# Patient Record
Sex: FEMALE | Race: BLACK OR AFRICAN AMERICAN | Employment: UNEMPLOYED | URBAN - METROPOLITAN AREA
[De-identification: names, ages, dates, MRNs, and addresses within clinical notes are randomized per-mention and may not be internally consistent; named-entity substitution may affect disease eponyms.]

---

## 2017-06-04 ENCOUNTER — HOSPITAL ENCOUNTER (INPATIENT)
Age: 72
LOS: 1 days | Discharge: NURSING, REHAB OR LONG-TERM CARE | DRG: 392 | End: 2017-06-06
Attending: EMERGENCY MEDICINE | Admitting: INTERNAL MEDICINE
Payer: COMMERCIAL

## 2017-06-04 ENCOUNTER — APPOINTMENT (OUTPATIENT)
Dept: GENERAL RADIOLOGY | Age: 72
DRG: 392 | End: 2017-06-04
Attending: EMERGENCY MEDICINE
Payer: COMMERCIAL

## 2017-06-04 ENCOUNTER — APPOINTMENT (OUTPATIENT)
Dept: CT IMAGING | Age: 72
DRG: 392 | End: 2017-06-04
Attending: EMERGENCY MEDICINE
Payer: COMMERCIAL

## 2017-06-04 DIAGNOSIS — I63.9 CVA (CEREBRAL INFARCTION): ICD-10-CM

## 2017-06-04 DIAGNOSIS — D64.9 ANEMIA: ICD-10-CM

## 2017-06-04 DIAGNOSIS — D64.9 ANEMIA, UNSPECIFIED TYPE: ICD-10-CM

## 2017-06-04 DIAGNOSIS — Z79.899 ENCOUNTER FOR LONG-TERM (CURRENT) USE OF OTHER MEDICATIONS: ICD-10-CM

## 2017-06-04 DIAGNOSIS — I10 HTN (HYPERTENSION): ICD-10-CM

## 2017-06-04 DIAGNOSIS — R10.13 ABDOMINAL PAIN, EPIGASTRIC: Primary | ICD-10-CM

## 2017-06-04 DIAGNOSIS — R77.8 ELEVATED TROPONIN: ICD-10-CM

## 2017-06-04 LAB
ALBUMIN SERPL BCP-MCNC: 3.3 G/DL (ref 3.5–5)
ALBUMIN/GLOB SERPL: 0.9 {RATIO} (ref 1.1–2.2)
ALP SERPL-CCNC: 90 U/L (ref 45–117)
ALT SERPL-CCNC: 18 U/L (ref 12–78)
ANION GAP BLD CALC-SCNC: 3 MMOL/L (ref 5–15)
AST SERPL W P-5'-P-CCNC: 13 U/L (ref 15–37)
BASOPHILS # BLD AUTO: 0.1 K/UL (ref 0–0.1)
BASOPHILS # BLD: 1 % (ref 0–1)
BILIRUB SERPL-MCNC: 0.3 MG/DL (ref 0.2–1)
BUN SERPL-MCNC: 26 MG/DL (ref 6–20)
BUN/CREAT SERPL: 23 (ref 12–20)
CALCIUM SERPL-MCNC: 9.2 MG/DL (ref 8.5–10.1)
CHLORIDE SERPL-SCNC: 107 MMOL/L (ref 97–108)
CK MB CFR SERPL CALC: NORMAL % (ref 0–2.5)
CK MB SERPL-MCNC: <1 NG/ML (ref 5–25)
CK SERPL-CCNC: 443 U/L (ref 26–192)
CO2 SERPL-SCNC: 31 MMOL/L (ref 21–32)
CREAT SERPL-MCNC: 1.15 MG/DL (ref 0.55–1.02)
DIFFERENTIAL METHOD BLD: ABNORMAL
EOSINOPHIL # BLD: 0.1 K/UL (ref 0–0.4)
EOSINOPHIL NFR BLD: 1 % (ref 0–7)
ERYTHROCYTE [DISTWIDTH] IN BLOOD BY AUTOMATED COUNT: 18.4 % (ref 11.5–14.5)
GLOBULIN SER CALC-MCNC: 3.7 G/DL (ref 2–4)
GLUCOSE SERPL-MCNC: 125 MG/DL (ref 65–100)
HCT VFR BLD AUTO: 29.3 % (ref 35–47)
HGB BLD-MCNC: 8.8 G/DL (ref 11.5–16)
LYMPHOCYTES # BLD AUTO: 13 % (ref 12–49)
LYMPHOCYTES # BLD: 1 K/UL (ref 0.8–3.5)
MCH RBC QN AUTO: 21.8 PG (ref 26–34)
MCHC RBC AUTO-ENTMCNC: 30 G/DL (ref 30–36.5)
MCV RBC AUTO: 72.7 FL (ref 80–99)
MONOCYTES # BLD: 0.6 K/UL (ref 0–1)
MONOCYTES NFR BLD AUTO: 8 % (ref 5–13)
NEUTS SEG # BLD: 6.1 K/UL (ref 1.8–8)
NEUTS SEG NFR BLD AUTO: 77 % (ref 32–75)
PLATELET # BLD AUTO: 221 K/UL (ref 150–400)
POTASSIUM SERPL-SCNC: 4.1 MMOL/L (ref 3.5–5.1)
PROT SERPL-MCNC: 7 G/DL (ref 6.4–8.2)
RBC # BLD AUTO: 4.03 M/UL (ref 3.8–5.2)
RBC MORPH BLD: ABNORMAL
SODIUM SERPL-SCNC: 141 MMOL/L (ref 136–145)
TROPONIN I SERPL-MCNC: 0.24 NG/ML
WBC # BLD AUTO: 7.9 K/UL (ref 3.6–11)

## 2017-06-04 PROCEDURE — 84484 ASSAY OF TROPONIN QUANT: CPT | Performed by: EMERGENCY MEDICINE

## 2017-06-04 PROCEDURE — 96374 THER/PROPH/DIAG INJ IV PUSH: CPT

## 2017-06-04 PROCEDURE — 99285 EMERGENCY DEPT VISIT HI MDM: CPT

## 2017-06-04 PROCEDURE — 85025 COMPLETE CBC W/AUTO DIFF WBC: CPT | Performed by: EMERGENCY MEDICINE

## 2017-06-04 PROCEDURE — 36415 COLL VENOUS BLD VENIPUNCTURE: CPT | Performed by: EMERGENCY MEDICINE

## 2017-06-04 PROCEDURE — 96375 TX/PRO/DX INJ NEW DRUG ADDON: CPT

## 2017-06-04 PROCEDURE — C9113 INJ PANTOPRAZOLE SODIUM, VIA: HCPCS | Performed by: EMERGENCY MEDICINE

## 2017-06-04 PROCEDURE — 71020 XR CHEST PA LAT: CPT

## 2017-06-04 PROCEDURE — 83690 ASSAY OF LIPASE: CPT | Performed by: EMERGENCY MEDICINE

## 2017-06-04 PROCEDURE — 74011250636 HC RX REV CODE- 250/636: Performed by: EMERGENCY MEDICINE

## 2017-06-04 PROCEDURE — 80053 COMPREHEN METABOLIC PANEL: CPT | Performed by: EMERGENCY MEDICINE

## 2017-06-04 PROCEDURE — 82550 ASSAY OF CK (CPK): CPT | Performed by: EMERGENCY MEDICINE

## 2017-06-04 PROCEDURE — 93005 ELECTROCARDIOGRAM TRACING: CPT

## 2017-06-04 PROCEDURE — 82553 CREATINE MB FRACTION: CPT | Performed by: EMERGENCY MEDICINE

## 2017-06-04 PROCEDURE — 74176 CT ABD & PELVIS W/O CONTRAST: CPT

## 2017-06-04 RX ORDER — PANTOPRAZOLE SODIUM 40 MG/10ML
40 INJECTION, POWDER, LYOPHILIZED, FOR SOLUTION INTRAVENOUS
Status: COMPLETED | OUTPATIENT
Start: 2017-06-04 | End: 2017-06-04

## 2017-06-04 RX ORDER — MORPHINE SULFATE 2 MG/ML
4 INJECTION, SOLUTION INTRAMUSCULAR; INTRAVENOUS
Status: COMPLETED | OUTPATIENT
Start: 2017-06-04 | End: 2017-06-04

## 2017-06-04 RX ORDER — ONDANSETRON 2 MG/ML
4 INJECTION INTRAMUSCULAR; INTRAVENOUS
Status: COMPLETED | OUTPATIENT
Start: 2017-06-04 | End: 2017-06-04

## 2017-06-04 RX ADMIN — PANTOPRAZOLE SODIUM 40 MG: 40 INJECTION, POWDER, FOR SOLUTION INTRAVENOUS at 22:37

## 2017-06-04 RX ADMIN — ONDANSETRON HYDROCHLORIDE 4 MG: 2 INJECTION, SOLUTION INTRAMUSCULAR; INTRAVENOUS at 23:04

## 2017-06-04 RX ADMIN — Medication 4 MG: at 23:04

## 2017-06-04 NOTE — IP AVS SNAPSHOT
[address] [facility] 83. 
[phone] Patient: Daija Smith MRN: [id number] QVL:[id number]/41/1556 You are allergic to the following Allergen Reactions Benadryl (Diphenhydramine Hcl) Not Reported This Time Codeine Hives Pcn (Penicillins) Hives Sulfa (Sulfonamide Antibiotics) Hives Recent Documentation Height Weight Breastfeeding? BMI OB Status Smoking Status 1.575 m 68.9 kg No 27.8 kg/m2 Postmenopausal Never Smoker Emergency Contacts Name Discharge Info Relation Home Work Mobile Rosaura Greco  Child [2] [phone] About your hospitalization You were admitted on:  June 5, 2017 You last received care in the:  [facility] You were discharged on:  June 6, 2017 Unit phone number:  [phone] Why you were hospitalized Your primary diagnosis was:  Not on File Your diagnoses also included:  Chest Pain, Anemia, Acs (Acute Coronary Syndrome) (Hcc), Elevated Troponin Providers Seen During Your Hospitalizations Provider Role Specialty Primary office phone Jonelle Goodpasture, DO Attending Provider Emergency Medicine [phone] Tressa Spencer MD Attending Provider Internal Medicine [phone] Your Primary Care Physician (PCP) Primary Care Physician Office Phone Office Fax Luciano Zhong [phone] Follow-up Information Follow up With Details Comments Contact Info Will Boudreaux MD In 1 week [facility] will follow with patient's appointment. [phone] [id number] 
[phone] Current Discharge Medication List  
  
START taking these medications Dose & Instructions Dispensing Information Comments Morning Noon Evening Bedtime  
 aspirin 81 mg chewable tablet Replaces:  ASPIR-LOW 81 mg tablet Your last dose was: Your next dose is:    
   
   
 Dose:  81 mg  
1 Tab by Per G Tube route daily for 30 days. Quantity:  30 Tab Refills:  0 CONTINUE these medications which have CHANGED Dose & Instructions Dispensing Information Comments Morning Noon Evening Bedtime  
 carvedilol 6.25 mg tablet Commonly known as:  Elisa Kang What changed:  how much to take Your last dose was: Your next dose is:    
   
   
 Dose:  12.5 mg Take 2 Tabs by mouth two (2) times daily (with meals) for 30 days. Quantity:  120 Tab Refills:  0 CONTINUE these medications which have NOT CHANGED Dose & Instructions Dispensing Information Comments Morning Noon Evening Bedtime  
 amLODIPine 10 mg tablet Commonly known as:  Karyle Rohrer Your last dose was: Your next dose is:    
   
   
 Dose:  10 mg  
10 mg by PEG Tube route daily. Refills:  0  
     
   
   
   
  
 cholecalciferol 1,000 unit Cap Commonly known as:  VITAMIN D3 Your last dose was: Your next dose is:    
   
   
 Dose:  1000 Units Take 1,000 Units by mouth daily. Refills:  0 DULCOLAX (BISACODYL) 5 mg EC tablet Generic drug:  bisacodyl Your last dose was: Your next dose is:    
   
   
 Dose:  5 mg Take 5 mg by mouth daily as needed for Constipation. Refills:  0  
     
   
   
   
  
 ferrous sulfate 325 mg (65 mg iron) Cper Your last dose was: Your next dose is: Take  by mouth three (3) times daily. Refills:  0  
     
   
   
   
  
 folic acid 1 mg tablet Commonly known as:  [employer] Your last dose was: Your next dose is: Take  by mouth daily. Refills:  0  
     
   
   
   
  
 gabapentin 100 mg capsule Commonly known as:  NEURONTIN Your last dose was: Your next dose is:    
   
   
 Dose:  300 mg Take 300 mg by mouth daily. Refills:  0  
     
   
   
   
  
 hydrALAZINE 50 mg tablet Commonly known as:  APRESOLINE Your last dose was: Your next dose is:    
   
   
 Dose:  25 mg  
25 mg by PEG Tube route three (3) times daily. Refills:  0  
     
   
   
   
  
 isosorbide dinitrate 10 mg tablet Commonly known as:  ISORDIL Your last dose was: Your next dose is: Take  by mouth three (3) times daily. Refills:  0 KEPPRA 750 mg tablet Generic drug:  levETIRAcetam  
   
Your last dose was: Your next dose is:    
   
   
 Dose:  750 mg  
750 mg by PEG Tube route two (2) times a day. Refills:  0  
     
   
   
   
  
 ketoconazole 2 % topical cream  
Commonly known as:  NIZORAL Your last dose was: Your next dose is:    
   
   
 Apply  to affected area. Indications: Apply to affected area every 8 hours as needed for rash Refills:  0  
     
   
   
   
  
 LASIX 20 mg tablet Generic drug:  furosemide Your last dose was: Your next dose is:    
   
   
 Dose:  20 mg  
20 mg by PEG Tube route daily. Refills:  0  
     
   
   
   
  
 levothyroxine 75 mcg tablet Commonly known as:  SYNTHROID Your last dose was: Your next dose is:    
   
   
 Dose:  75 mcg Take 75 mcg by mouth Daily (before breakfast). Refills:  0 LIPITOR 40 mg tablet Generic drug:  atorvastatin Your last dose was: Your next dose is:    
   
   
 Dose:  40 mg Take 40 mg by mouth daily. Refills:  0  
     
   
   
   
  
 nitroglycerin 0.4 mg SL tablet Commonly known as:  NITROSTAT Your last dose was: Your next dose is:    
   
   
 by SubLINGual route every five (5) minutes as needed. Refills:  0 NovoLIN N 100 unit/mL injection Generic drug:  insulin NPH Your last dose was: Your next dose is:    
   
   
 Dose:  14 Units 14 Units by SubCUTAneous route every twelve (12) hours. Refills:  0  
     
   
   
   
  
 potassium chloride 20 mEq packet Commonly known as:  KLOR-CON Your last dose was: Your next dose is:    
   
   
 Dose:  20 mEq Take 20 mEq by mouth daily (with breakfast). Refills:  0  
     
   
   
   
  
 raNITIdine hcl 150 mg capsule Your last dose was: Your next dose is:    
   
   
 Dose:  150 mg Take 150 mg by mouth two (2) times a day. Refills:  0 Senna 8.6 mg Cap Generic drug:  sennosides Your last dose was: Your next dose is: Take  by mouth. Qd prn Refills:  0  
     
   
   
   
  
 TYLENOL 325 mg tablet Generic drug:  acetaminophen Your last dose was: Your next dose is: Take  by mouth every four (4) hours as needed for Pain. Refills:  0 STOP taking these medications ASPIR-LOW 81 mg tablet Generic drug:  aspirin delayed-release Replaced by:  aspirin 81 mg chewable tablet  
   
  
 aspirin, buffered 81 mg Tab Where to Get Your Medications Information on where to get these meds will be given to you by the nurse or doctor. ! Ask your nurse or doctor about these medications  
  aspirin 81 mg chewable tablet  
 carvedilol 6.25 mg tablet Discharge Instructions HOSPITALIST DISCHARGE INSTRUCTIONS 
 
NAME: Cinthya Elias :  1945 MRN:  [id number] Date/Time:  2017 1:22 PM 
 
ADMIT DATE: 2017 DISCHARGE DATE: 2017 Attending Physician: Víctor Curry MD 
 
DISCHARGE DIAGNOSIS: 
Chronic epigastric/chest pain with minimally elevated troponin in setting of CAD and remote ICH with dyphagia and chronic PEG tube DM2 with nephropathy (CKD3, at baseline) and anemia of CKD 
HTN, benign/essential 
Hyperlipidemia Hypothyroidism Medications: Per above medication reconciliation. Pain Management: per above medications Recommended diet: PEG feeding (TwoCal) at 30 ml/hr continuous with free water flushes 125mL every 6 hours Recommended activity: Activity as tolerated Wound care: None Indwelling devices:  PEG tube with routine nursing care Supplemental Oxygen: None Required Lab work: None Glucose management:  Accucheck every 6 hours with sliding scale per SNF protocol Code status: Full Outside physician follow up: Follow-up Information Follow up With Details Comments Contact Info Rahul Bennett MD In 1 week  [address] [facility] 
[phone] Skilled nursing facility/ SNF MD responsible for above on discharge. Information obtained by : 
I understand that if any problems occur once I am at home I am to contact my physician. I understand and acknowledge receipt of the instructions indicated above. Physician's or R.N.'s Signature                                                                  Date/Time Patient or Representative Discharge Orders None [facility] Announcement We are excited to announce that we are making your provider's discharge notes available to you in CDELhart. You will see these notes when they are completed and signed by the physician that discharged you from your recent hospital stay. If you have any questions or concerns about any information you see in CDELhart, please call the Health Information Department where you were seen or reach out to your Primary Care Provider for more information about your plan of care. Introducing [facility] SERVICES! [address] [facility] introduces [employer] patient portal. Now you can access parts of your medical record, email your doctor's office, and request medication refills online. 1. In your internet browser, go to [email or web address] 2. Click on the First Time User? Click Here link in the Sign In box. You will see the New Member Sign Up page. 3. Enter your [employer] Access Code exactly as it appears below. You will not need to use this code after youve completed the sign-up process. If you do not sign up before the expiration date, you must request a new code. · [employer] Access Code: [email or web address] Expires: 9/3/2017 10:02 AM 
 
4. Enter the last four digits of your Social Security Number (xxxx) and Date of Birth (mm/dd/yyyy) as indicated and click Submit. You will be taken to the next sign-up page. 5. Create a [employer] ID. This will be your [employer] login ID and cannot be changed, so think of one that is secure and easy to remember. 6. Create a [employer] password. You can change your password at any time. 7. Enter your Password Reset Question and Answer. This can be used at a later time if you forget your password. 8. Enter your e-mail address. You will receive e-mail notification when new information is available in [address] 9. Click Sign Up. You can now view and download portions of your medical record. 10. Click the Download Summary menu link to download a portable copy of your medical information. If you have questions, please visit the Frequently Asked Questions section of the [employer] website. Remember, [employer] is NOT to be used for urgent needs. For medical emergencies, dial 911. Now available from your iPhone and Android! General Information Please provide this summary of care documentation to your next provider. Patient Signature:  ____________________________________________________________ Date:  ____________________________________________________________  
  
Anita Escobar Provider Signature:  ____________________________________________________________ Date:  ____________________________________________________________

## 2017-06-05 PROBLEM — R77.8 ELEVATED TROPONIN: Status: ACTIVE | Noted: 2017-06-05

## 2017-06-05 PROBLEM — I24.9 ACS (ACUTE CORONARY SYNDROME) (HCC): Status: ACTIVE | Noted: 2017-06-05

## 2017-06-05 PROBLEM — D64.9 ANEMIA: Status: ACTIVE | Noted: 2017-06-05

## 2017-06-05 PROBLEM — R07.9 CHEST PAIN: Status: ACTIVE | Noted: 2017-06-05

## 2017-06-05 LAB
ATRIAL RATE: 76 BPM
CALCULATED R AXIS, ECG10: -16 DEGREES
CALCULATED T AXIS, ECG11: 71 DEGREES
DIAGNOSIS, 93000: NORMAL
LIPASE SERPL-CCNC: 145 U/L (ref 73–393)
Q-T INTERVAL, ECG07: 444 MS
QRS DURATION, ECG06: 74 MS
QTC CALCULATION (BEZET), ECG08: 499 MS
TROPONIN I SERPL-MCNC: 0.22 NG/ML
TROPONIN I SERPL-MCNC: 0.23 NG/ML
VENTRICULAR RATE, ECG03: 76 BPM

## 2017-06-05 PROCEDURE — 36415 COLL VENOUS BLD VENIPUNCTURE: CPT | Performed by: INTERNAL MEDICINE

## 2017-06-05 PROCEDURE — 74011000250 HC RX REV CODE- 250: Performed by: INTERNAL MEDICINE

## 2017-06-05 PROCEDURE — C9113 INJ PANTOPRAZOLE SODIUM, VIA: HCPCS | Performed by: INTERNAL MEDICINE

## 2017-06-05 PROCEDURE — 74011250637 HC RX REV CODE- 250/637: Performed by: INTERNAL MEDICINE

## 2017-06-05 PROCEDURE — 65660000000 HC RM CCU STEPDOWN

## 2017-06-05 PROCEDURE — 74011250636 HC RX REV CODE- 250/636: Performed by: INTERNAL MEDICINE

## 2017-06-05 PROCEDURE — 84484 ASSAY OF TROPONIN QUANT: CPT | Performed by: EMERGENCY MEDICINE

## 2017-06-05 PROCEDURE — 93306 TTE W/DOPPLER COMPLETE: CPT

## 2017-06-05 PROCEDURE — 74011636637 HC RX REV CODE- 636/637: Performed by: INTERNAL MEDICINE

## 2017-06-05 RX ORDER — RANITIDINE 15 MG/ML
150 SYRUP ORAL 2 TIMES DAILY
Status: DISCONTINUED | OUTPATIENT
Start: 2017-06-05 | End: 2017-06-06 | Stop reason: HOSPADM

## 2017-06-05 RX ORDER — ISOSORBIDE DINITRATE 10 MG/1
TABLET ORAL 3 TIMES DAILY
COMMUNITY

## 2017-06-05 RX ORDER — KETOCONAZOLE 20 MG/G
CREAM TOPICAL
COMMUNITY

## 2017-06-05 RX ORDER — RANITIDINE 150 MG/1
150 CAPSULE ORAL 2 TIMES DAILY
COMMUNITY

## 2017-06-05 RX ORDER — ISOSORBIDE DINITRATE 10 MG/1
10 TABLET ORAL 3 TIMES DAILY
Status: DISCONTINUED | OUTPATIENT
Start: 2017-06-05 | End: 2017-06-06 | Stop reason: HOSPADM

## 2017-06-05 RX ORDER — ONDANSETRON 2 MG/ML
4 INJECTION INTRAMUSCULAR; INTRAVENOUS
Status: DISCONTINUED | OUTPATIENT
Start: 2017-06-05 | End: 2017-06-06 | Stop reason: HOSPADM

## 2017-06-05 RX ORDER — NITROGLYCERIN 0.4 MG/1
0.4 TABLET SUBLINGUAL AS NEEDED
Status: DISCONTINUED | OUTPATIENT
Start: 2017-06-05 | End: 2017-06-06 | Stop reason: HOSPADM

## 2017-06-05 RX ORDER — ASPIRIN 81 MG/1
81 TABLET ORAL DAILY
Status: DISCONTINUED | OUTPATIENT
Start: 2017-06-05 | End: 2017-06-05

## 2017-06-05 RX ORDER — POTASSIUM CHLORIDE 1.5 G/1.77G
20 POWDER, FOR SOLUTION ORAL
COMMUNITY

## 2017-06-05 RX ORDER — SODIUM CHLORIDE 0.9 % (FLUSH) 0.9 %
5-10 SYRINGE (ML) INJECTION EVERY 8 HOURS
Status: DISCONTINUED | OUTPATIENT
Start: 2017-06-05 | End: 2017-06-06 | Stop reason: HOSPADM

## 2017-06-05 RX ORDER — HYDRALAZINE HYDROCHLORIDE 25 MG/1
25 TABLET, FILM COATED ORAL 3 TIMES DAILY
Status: DISCONTINUED | OUTPATIENT
Start: 2017-06-05 | End: 2017-06-06 | Stop reason: HOSPADM

## 2017-06-05 RX ORDER — CARVEDILOL 6.25 MG/1
TABLET ORAL 2 TIMES DAILY WITH MEALS
Status: ON HOLD | COMMUNITY
End: 2017-06-06

## 2017-06-05 RX ORDER — CARVEDILOL 6.25 MG/1
6.25 TABLET ORAL 2 TIMES DAILY WITH MEALS
Status: DISCONTINUED | OUTPATIENT
Start: 2017-06-05 | End: 2017-06-05

## 2017-06-05 RX ORDER — SENNOSIDES 8.6 MG/1
1 TABLET ORAL DAILY PRN
Status: DISCONTINUED | OUTPATIENT
Start: 2017-06-05 | End: 2017-06-06 | Stop reason: HOSPADM

## 2017-06-05 RX ORDER — AMLODIPINE BESYLATE 5 MG/1
10 TABLET ORAL DAILY
Status: DISCONTINUED | OUTPATIENT
Start: 2017-06-05 | End: 2017-06-06 | Stop reason: HOSPADM

## 2017-06-05 RX ORDER — GUAIFENESIN 100 MG/5ML
81 LIQUID (ML) ORAL DAILY
Status: DISCONTINUED | OUTPATIENT
Start: 2017-06-06 | End: 2017-06-06 | Stop reason: HOSPADM

## 2017-06-05 RX ORDER — GLUCOSAMINE SULFATE 1500 MG
1000 POWDER IN PACKET (EA) ORAL DAILY
COMMUNITY

## 2017-06-05 RX ORDER — METOPROLOL TARTRATE 5 MG/5ML
5 INJECTION INTRAVENOUS
Status: DISCONTINUED | OUTPATIENT
Start: 2017-06-05 | End: 2017-06-06 | Stop reason: HOSPADM

## 2017-06-05 RX ORDER — HYDRALAZINE HYDROCHLORIDE 50 MG/1
25 TABLET, FILM COATED ORAL 3 TIMES DAILY
COMMUNITY

## 2017-06-05 RX ORDER — LEVOTHYROXINE SODIUM 75 UG/1
75 TABLET ORAL
Status: DISCONTINUED | OUTPATIENT
Start: 2017-06-05 | End: 2017-06-05

## 2017-06-05 RX ORDER — ATORVASTATIN CALCIUM 40 MG/1
40 TABLET, FILM COATED ORAL DAILY
COMMUNITY

## 2017-06-05 RX ORDER — ATORVASTATIN CALCIUM 40 MG/1
40 TABLET, FILM COATED ORAL DAILY
Status: DISCONTINUED | OUTPATIENT
Start: 2017-06-05 | End: 2017-06-06 | Stop reason: HOSPADM

## 2017-06-05 RX ORDER — FUROSEMIDE 20 MG/1
20 TABLET ORAL DAILY
Status: DISCONTINUED | OUTPATIENT
Start: 2017-06-05 | End: 2017-06-06 | Stop reason: HOSPADM

## 2017-06-05 RX ORDER — GABAPENTIN 300 MG/1
300 CAPSULE ORAL DAILY
Status: DISCONTINUED | OUTPATIENT
Start: 2017-06-05 | End: 2017-06-06 | Stop reason: HOSPADM

## 2017-06-05 RX ORDER — FUROSEMIDE 20 MG/1
20 TABLET ORAL DAILY
COMMUNITY

## 2017-06-05 RX ORDER — CARVEDILOL 12.5 MG/1
12.5 TABLET ORAL 2 TIMES DAILY WITH MEALS
Status: DISCONTINUED | OUTPATIENT
Start: 2017-06-06 | End: 2017-06-06 | Stop reason: HOSPADM

## 2017-06-05 RX ORDER — ENOXAPARIN SODIUM 100 MG/ML
30 INJECTION SUBCUTANEOUS EVERY 24 HOURS
Status: DISCONTINUED | OUTPATIENT
Start: 2017-06-05 | End: 2017-06-06

## 2017-06-05 RX ORDER — LEVETIRACETAM 750 MG/1
750 TABLET ORAL 2 TIMES DAILY
COMMUNITY

## 2017-06-05 RX ORDER — SODIUM CHLORIDE 0.9 % (FLUSH) 0.9 %
5-10 SYRINGE (ML) INJECTION AS NEEDED
Status: DISCONTINUED | OUTPATIENT
Start: 2017-06-05 | End: 2017-06-06 | Stop reason: HOSPADM

## 2017-06-05 RX ORDER — LEVOTHYROXINE SODIUM 75 UG/1
75 TABLET ORAL
COMMUNITY

## 2017-06-05 RX ORDER — AMLODIPINE BESYLATE 10 MG/1
10 TABLET ORAL DAILY
COMMUNITY

## 2017-06-05 RX ORDER — ACETAMINOPHEN 325 MG/1
650 TABLET ORAL
Status: DISCONTINUED | OUTPATIENT
Start: 2017-06-05 | End: 2017-06-06 | Stop reason: HOSPADM

## 2017-06-05 RX ORDER — LEVOTHYROXINE SODIUM 75 UG/1
75 TABLET ORAL
Status: DISCONTINUED | OUTPATIENT
Start: 2017-06-06 | End: 2017-06-06 | Stop reason: HOSPADM

## 2017-06-05 RX ORDER — KETOROLAC TROMETHAMINE 30 MG/ML
15 INJECTION, SOLUTION INTRAMUSCULAR; INTRAVENOUS
Status: DISCONTINUED | OUTPATIENT
Start: 2017-06-05 | End: 2017-06-06 | Stop reason: HOSPADM

## 2017-06-05 RX ADMIN — LEVETIRACETAM 750 MG: 250 TABLET, FILM COATED ORAL at 09:07

## 2017-06-05 RX ADMIN — AMLODIPINE BESYLATE 10 MG: 5 TABLET ORAL at 09:12

## 2017-06-05 RX ADMIN — CARVEDILOL 6.25 MG: 6.25 TABLET, FILM COATED ORAL at 17:00

## 2017-06-05 RX ADMIN — ISOSORBIDE DINITRATE 10 MG: 10 TABLET ORAL at 16:44

## 2017-06-05 RX ADMIN — LEVOTHYROXINE SODIUM 75 MCG: 75 TABLET ORAL at 08:47

## 2017-06-05 RX ADMIN — HYDRALAZINE HYDROCHLORIDE 25 MG: 25 TABLET, FILM COATED ORAL at 21:02

## 2017-06-05 RX ADMIN — GABAPENTIN 300 MG: 300 CAPSULE ORAL at 09:12

## 2017-06-05 RX ADMIN — FUROSEMIDE 20 MG: 20 TABLET ORAL at 09:09

## 2017-06-05 RX ADMIN — ISOSORBIDE DINITRATE 10 MG: 10 TABLET ORAL at 21:01

## 2017-06-05 RX ADMIN — INSULIN HUMAN 14 UNITS: 100 INJECTION, SUSPENSION SUBCUTANEOUS at 19:59

## 2017-06-05 RX ADMIN — Medication 10 ML: at 08:48

## 2017-06-05 RX ADMIN — ASPIRIN 81 MG: 81 TABLET, COATED ORAL at 09:08

## 2017-06-05 RX ADMIN — Medication 10 ML: at 15:50

## 2017-06-05 RX ADMIN — HYDRALAZINE HYDROCHLORIDE 25 MG: 25 TABLET, FILM COATED ORAL at 09:07

## 2017-06-05 RX ADMIN — ISOSORBIDE DINITRATE 10 MG: 10 TABLET ORAL at 09:08

## 2017-06-05 RX ADMIN — RANITIDINE 150 MG: 15 SYRUP ORAL at 19:59

## 2017-06-05 RX ADMIN — LEVETIRACETAM 750 MG: 250 TABLET, FILM COATED ORAL at 18:12

## 2017-06-05 RX ADMIN — ENOXAPARIN SODIUM 30 MG: 30 INJECTION SUBCUTANEOUS at 08:47

## 2017-06-05 RX ADMIN — ACETAMINOPHEN 650 MG: 325 TABLET, FILM COATED ORAL at 15:50

## 2017-06-05 RX ADMIN — HYDRALAZINE HYDROCHLORIDE 25 MG: 25 TABLET, FILM COATED ORAL at 16:45

## 2017-06-05 RX ADMIN — ATORVASTATIN CALCIUM 40 MG: 40 TABLET, FILM COATED ORAL at 09:08

## 2017-06-05 RX ADMIN — SODIUM CHLORIDE 40 MG: 9 INJECTION, SOLUTION INTRAMUSCULAR; INTRAVENOUS; SUBCUTANEOUS at 03:56

## 2017-06-05 RX ADMIN — CARVEDILOL 6.25 MG: 6.25 TABLET, FILM COATED ORAL at 08:47

## 2017-06-05 NOTE — H&P
Hospitalist Admission Note    NAME: Cole Barraza   :  1945   MRN:  [id number]     Date/Time:  2017 2:48 AM    Patient PCP: Bekah Lara MD  ________________________________________________________________________    My assessment of this patient's clinical condition and my plan of care is as follows. Assessment / Plan:  Epigastric Chest Pain POA  Elevated Troponins POA  R/o ACS  H/o CAD , CVA  Anemia  Trop= 0.24  EKG= ?inferior lead changes   CXR neg  CT A/P negative except Constipation    Admit to telemetry bed  Serial Troponins for now  Cardiology consulted by ER  Check Echo in AM  GI consult for mikel-PEG pain  IV protonix for now  Check Stool for occult blood    HTN  CAD  Hyperlipidemia    Cont Norvasc, coreg, hydralazine  ASA, Isordil, Lasix  Cont statin    Hypothyroidism  Cont synthroid      H/o ICH  Dysphagia  Constipation  Cont PEG tube feeding  Aggressive bowel regimen          Code Status: Full  Surrogate Decision Maker: daughter Jorge Barraza    DVT Prophylaxis: SQ lovenox  GI Prophylaxis: PPI    Baseline: [facility] at [facility] as [facility] term [facility]? Subjective:   CHIEF COMPLAINT: Chest/epigastric pain x 1 day    HISTORY OF PRESENT ILLNESS:     Vladimir Higuera is a 80 y.o.   female who presents with CC of sudden onset epigastric chest/abdominal pain x evening at the [facility]. Pt was found to have mildly elevated Troponins in ER with ? EKG changes , neg CTA/P except constipation  H/o taking 3 NTG at NH with no relief  Pt claims she is having pain close to her long standing PEG tube. H/o ICH with dysphagia for which she had PEG tube for feeding- had got tube changed x 3 now at [facility]      We were asked to admit for work up and evaluation of the above problems.      Past Medical History:   Diagnosis Date    CAD (coronary artery disease)     Diabetes ([address]     Hypercholesterolemia 2014    Hypertension     Stroke [facility])         Past Surgical History: Procedure Laterality Date    CARDIAC SURG PROCEDURE UNLIST         Social History   Substance Use Topics    Smoking status: Never Smoker    Smokeless tobacco: Not on file    Alcohol use No        Family History   Problem Relation Age of Onset    Stroke Mother     Stroke Father     Diabetes Father      Allergies   Allergen Reactions    Codeine Hives    Pcn [Penicillins] Hives    Sulfa (Sulfonamide Antibiotics) Hives        Prior to Admission medications    Medication Sig Start Date End Date Taking? Authorizing Provider   amLODIPine (NORVASC) 10 mg tablet 10 mg by PEG Tube route daily. Yes Historical Provider   atorvastatin (LIPITOR) 40 mg tablet Take 40 mg by mouth daily. Yes Historical Provider   carvedilol (COREG) 6.25 mg tablet Take  by mouth two (2) times daily (with meals). Yes Historical Provider   hydrALAZINE (APRESOLINE) 50 mg tablet 25 mg by PEG Tube route three (3) times daily. Yes Historical Provider   isosorbide dinitrate (ISORDIL) 10 mg tablet Take  by mouth three (3) times daily. Yes Historical Provider   furosemide (LASIX) 20 mg tablet 20 mg by PEG Tube route daily. Yes Historical Provider   levETIRAcetam (KEPPRA) 750 mg tablet 750 mg by PEG Tube route two (2) times a day. Yes Historical Provider   ferrous sulfate 325 mg (65 mg iron) cpER Take  by mouth three (3) times daily. Historical Provider   bisacodyl (DULCOLAX, BISACODYL,) 5 mg EC tablet Take 5 mg by mouth daily as needed for Constipation. Historical Provider   TRAZODONE HCL (DESYREL PO) Take 50 mg by mouth nightly as needed. Historical Provider   levothyroxine (SYNTHROID) 25 mcg tablet Take 75 mcg by mouth Daily (before breakfast). Historical Provider   folic acid (FOLVITE) 1 mg tablet Take  by mouth daily. Historical Provider   INSULIN ISOPHANE PORK PURE SC 14 Units by SubCUTAneous route every twelve (12) hours every twelve (12) hours.     Historical Provider   acetaminophen (TYLENOL) 325 mg tablet Take by mouth every four (4) hours as needed for Pain. Historical Provider   Aspirin, Buffered 81 mg tab Take  by mouth. Historical Provider   sennosides (SENNA) 8.6 mg cap Take  by mouth. Qd prn    Historical Provider   gabapentin (NEURONTIN) 100 mg capsule Take 300 mg by mouth daily. Historical Provider   nitroglycerin (NITROSTAT) 0.4 mg SL tablet by SubLINGual route every five (5) minutes as needed. Historical Provider   aspirin delayed-release (ASPIR-LOW) 81 mg tablet Take 81 mg by mouth daily. Tapan Elias MD       REVIEW OF SYSTEMS:           Total of 12 systems reviewed as follows:       POSITIVE= underlined text  Negative = text not underlined  General:  fever, chills, sweats, generalized weakness, weight loss/gain,      loss of appetite   Eyes:    blurred vision, eye pain, loss of vision, double vision  ENT:    rhinorrhea, pharyngitis   Respiratory:   cough, sputum production, SOB, MURCIA, wheezing, pleuritic pain   Cardiology:   chest pain, palpitations, orthopnea, PND, edema, syncope   Gastrointestinal:  Epigastric abdominal pain , N/V, diarrhea, dysphagia, constipation, bleeding   Genitourinary:  frequency, urgency, dysuria, hematuria, incontinence   Muskuloskeletal :  arthralgia, myalgia, back pain  Hematology:  easy bruising, nose or gum bleeding, lymphadenopathy   Dermatological: rash, ulceration, pruritis, color change / jaundice  Endocrine:   hot flashes or polydipsia   Neurological:  headache, dizziness, confusion, focal weakness, paresthesia,     Speech difficulties, memory loss, gait difficulty  Psychological: Feelings of anxiety, depression, agitation    Objective:   VITALS:    Visit Vitals    /74 (BP 1 Location: Left arm, BP Patient Position: At rest)    Pulse 73    Temp 97.8 °F (36.6 °C)    Resp 15    SpO2 98%       PHYSICAL EXAM:    General:    Alert, cooperative, no distress, appears stated age.      HEENT: Atraumatic, anicteric sclerae, pink conjunctivae, Halitosis noted +     No oral ulcers, mucosa moist, throat clear, dentition fair  Neck:  Supple, symmetrical,  thyroid: non tender  Lungs:   Clear to auscultation bilaterally. No Wheezing or Rhonchi. No rales. Chest wall:  No tenderness  No Accessory muscle use. Heart:   Regular  rhythm,  No  murmur   No edema  Abdomen:   Soft, non-tender. Not distended. Bowel sounds normal, PEG tube site noted +  Extremities: No cyanosis. No clubbing,      Skin turgor normal, Capillary refill normal, Radial dial pulse 2+  Skin:     Not pale. Not Jaundiced  No rashes   Psych:  Good insight. Not depressed. Not anxious or agitated. Neurologic: EOMs intact. No facial asymmetry. Chronic aphasic speech noted + Symmetrical strength, Sensation grossly intact. Alert and oriented X 4.     _______________________________________________________________________  Care Plan discussed with:    Comments   Patient x    Family      RN x    Care Manager                    Consultant:  donnell Stahl   _______________________________________________________________________  Expected  Disposition:   Home with Family    HH/PT/OT/RN    SNF/LTC x   PEPE    ________________________________________________________________________  TOTAL TIME:  72 Minutes    Critical Care Provided     Minutes non procedure based      Comments    x Reviewed previous records   >50% of visit spent in counseling and coordination of care  Discussion with patient and/or family and questions answered       ________________________________________________________________________  Signed: Latoya Man MD    Procedures: see electronic medical records for all procedures/Xrays and details which were not copied into this note but were reviewed prior to creation of Plan.     LAB DATA REVIEWED:    Recent Results (from the past 24 hour(s))   EKG, 12 LEAD, INITIAL    Collection Time: 06/04/17  8:32 PM   Result Value Ref Range    Ventricular Rate 76 BPM    Atrial Rate 76 BPM    QRS Duration 74 ms    Q-T Interval 444 ms    QTC Calculation (Bezet) 499 ms    Calculated R Axis -16 degrees    Calculated T Axis 71 degrees    Diagnosis       Accelerated Junctional rhythm  Inferior infarct (cited on or before 29-APR-2010)  When compared with ECG of 29-APR-2010 16:19,  Junctional rhythm has replaced Sinus rhythm  Criteria for Anterior infarct are no longer present  Criteria for Anterolateral infarct are no longer present     CBC WITH AUTOMATED DIFF    Collection Time: 06/04/17  8:34 PM   Result Value Ref Range    WBC 7.9 3.6 - 11.0 K/uL    RBC 4.03 3.80 - 5.20 M/uL    HGB 8.8 (L) 11.5 - 16.0 g/dL    HCT 29.3 (L) 35.0 - 47.0 %    MCV 72.7 (L) 80.0 - 99.0 FL    MCH 21.8 (L) 26.0 - 34.0 PG    MCHC 30.0 30.0 - 36.5 g/dL    RDW 18.4 (H) 11.5 - 14.5 %    PLATELET 048 827 - 448 K/uL    NEUTROPHILS 77 (H) 32 - 75 %    LYMPHOCYTES 13 12 - 49 %    MONOCYTES 8 5 - 13 %    EOSINOPHILS 1 0 - 7 %    BASOPHILS 1 0 - 1 %    ABS. NEUTROPHILS 6.1 1.8 - 8.0 K/UL    ABS. LYMPHOCYTES 1.0 0.8 - 3.5 K/UL    ABS. MONOCYTES 0.6 0.0 - 1.0 K/UL    ABS. EOSINOPHILS 0.1 0.0 - 0.4 K/UL    ABS. BASOPHILS 0.1 0.0 - 0.1 K/UL    DF SMEAR SCANNED      RBC COMMENTS ANISOCYTOSIS  1+        RBC COMMENTS MICROCYTOSIS  1+        RBC COMMENTS OVALOCYTES  PRESENT       METABOLIC PANEL, COMPREHENSIVE    Collection Time: 06/04/17  8:34 PM   Result Value Ref Range    Sodium 141 136 - 145 mmol/L    Potassium 4.1 3.5 - 5.1 mmol/L    Chloride 107 97 - 108 mmol/L    CO2 31 21 - 32 mmol/L    Anion gap 3 (L) 5 - 15 mmol/L    Glucose 125 (H) 65 - 100 mg/dL    BUN 26 (H) 6 - 20 MG/DL    Creatinine 1.15 (H) 0.55 - 1.02 MG/DL    BUN/Creatinine ratio 23 (H) 12 - 20      GFR est AA 57 (L) >60 ml/min/1.73m2    GFR est non-AA 47 (L) >60 ml/min/1.73m2    Calcium 9.2 8.5 - 10.1 MG/DL    Bilirubin, total 0.3 0.2 - 1.0 MG/DL    ALT (SGPT) 18 12 - 78 U/L    AST (SGOT) 13 (L) 15 - 37 U/L    Alk.  phosphatase 90 45 - 117 U/L    Protein, total 7.0 6.4 - 8.2 g/dL    Albumin 3.3 (L) 3.5 - 5.0 g/dL    Globulin 3.7 2.0 - 4.0 g/dL    A-G Ratio 0.9 (L) 1.1 - 2.2     TROPONIN I    Collection Time: 06/04/17  8:34 PM   Result Value Ref Range    Troponin-I, Qt. 0.24 (H) <0.05 ng/mL   CK W/ REFLX CKMB    Collection Time: 06/04/17  8:34 PM   Result Value Ref Range     (H) 26 - 192 U/L   CK-MB,QUANT.     Collection Time: 06/04/17  8:34 PM   Result Value Ref Range    CK - MB <1.0 <3.6 NG/ML    CK-MB Index Cannot be calulated 0 - 2.5     LIPASE    Collection Time: 06/04/17  8:34 PM   Result Value Ref Range    Lipase 145 73 - 393 U/L

## 2017-06-05 NOTE — DIABETES MGMT
[facility]        Recommendations/ Comments: If appropriate, please consider the followin. Adding a new A1C test to next lab draw to assess home management. 2. Adding normal sensitivity humalog correctional insulin scale with q6hr POC glucose checks       A1c:   Lab Results   Component Value Date/Time    Hemoglobin A1c 13.7 2010 03:44 AM    Hemoglobin A1c 11.9 10/12/2009 08:31 PM       Will continue to follow as needed. Thank you.     Natacha Dennis, [facility]  Office: [phone]

## 2017-06-05 NOTE — ROUTINE PROCESS
PCU SHIFT NURSING NOTE      Bedside shift change report given to Geno RN and Joseph Lang RN (oncoming nurse) by Deborah Gandara (offgoing nurse). Report included the following information SBAR, Kardex, Intake/Output, MAR, Recent Results and Cardiac Rhythm NSR. Shift Summary:   0730: Dr Marquis Costa at bedside to discuss plan of care. No further questions or concerns at this time   1040: Dr Lisset Garcia at bedside discussing plan of care   1200: Dr Ann Francisco who has been primary care physician for some time now called to give an update on patients care. Left number for Dr Lisset Garcia to call [phone]  1800: No further significant events     Admission Date 6/4/2017   Admission Diagnosis Chest pain  Elevated troponin  ACS (acute coronary syndrome) ([facility] [address]  Anemia   Consults IP CONSULT TO CARDIOLOGY  IP CONSULT TO GASTROENTEROLOGY        Consults   []PT   []OT   []Speech   [x]Case Management      [] Palliative      Cardiac Monitoring Order   [x]Yes   []No     IV drips   []Yes    Drip:                            Dose:  Drip:                            Dose:  Drip:                            Dose:   [x]No     GI Prophylaxis   [x]Yes   []No         DVT Prophylaxis   SCDs:             Kong stockings:         [] Medication   []Contraindicated   [x]None      Activity Level Activity Level: Bed Rest     Activity Assistance: Partial (two people)   Purposeful Rounding every 1-2 hour? [x]Yes   Vanegas Score  Total Score: 2   Bed Alarm (If score 3 or >)   []Yes   [] Refused (See signed refusal form in chart)   Antoine Score  Antoine Score: 12   Antoine Score (if score 14 or less)   [x]PMT consult   []Wound Care consult      []Specialty bed   [] Nutrition consult          Needs prior to discharge:   Home O2 required:    []Yes   [x]No    If yes, how much O2 required?     Other:    Last Bowel Movement: Last Bowel Movement Date: 06/04/17      Influenza Vaccine Received Flu Vaccine for Current Season (usually Sept-March): Not Flu Season        Pneumonia Vaccine           Diet Active Orders   Diet    DIET NPO      LDAs               Peripheral IV 06/04/17 Left Forearm (Active)   Site Assessment Clean, dry, & intact 6/4/2017  8:49 PM   Phlebitis Assessment 0 6/4/2017  8:49 PM   Infiltration Assessment 0 6/4/2017  8:49 PM   Dressing Status Clean, dry, & intact 6/4/2017  8:49 PM   Dressing Type Tape;Transparent 6/4/2017  8:49 PM   Hub Color/Line Status Pink;Flushed;Patent 6/4/2017  8:49 PM          G/J Tube (Active)                Urinary Catheter      Intake & Output        Readmission Risk Assessment Tool Score Medium Risk            17       Total Score        3 Relationship with PCP    2 Patient Living Status    4 More than 1 Admission in calendar year    4 Patient Insurance is Medicare, Medicaid or Self Pay    4 Charlson Comorbidity Score        Criteria that do not apply:    Patient Length of Stay > 5       Expected Length of Stay 1d 19h   Actual Length of Stay 0

## 2017-06-05 NOTE — PROGRESS NOTES
Pharmacy Medication Reconciliation     The patient was not able to be interviewed regarding current PTA medication list, use and drug allergies. Pharmacy contacted [facility] where she was before admission. Her medication list was faxed over. Allergy Update:  Benadryl was added    Recommendations/Findings: The following amendments were made to the patient's active medication list on file at [facility]:   1) Additions:    Cholecalciferol 1000 units daily    Ranitidine 150 mg 1 Capsule BID    Potassium chloride 20 mEq daily   Ketoconazole 2% cream    2) Deletions:    Trazodone 50 mg HS PRN     3) Changes:    Clarified that insulin used was Novolin N 14 units SC Q12 hr    Changed levothyroxine 25 mcg to 75 mcg daily before breakfast.      -Clarified PTA med list with records from [facility]. PTA medication list was corrected to the following:     Prior to Admission Medications   Prescriptions Last Dose Informant Patient Reported? Taking? Aspirin, Buffered 81 mg tab   Yes No   Sig: Take  by mouth. acetaminophen (TYLENOL) 325 mg tablet   Yes No   Sig: Take  by mouth every four (4) hours as needed for Pain. amLODIPine (NORVASC) 10 mg tablet   Yes Yes   Sig: 10 mg by PEG Tube route daily. aspirin delayed-release (ASPIR-LOW) 81 mg tablet   Yes No   Sig: Take 81 mg by mouth daily. atorvastatin (LIPITOR) 40 mg tablet   Yes Yes   Sig: Take 40 mg by mouth daily. bisacodyl (DULCOLAX, BISACODYL,) 5 mg EC tablet   Yes No   Sig: Take 5 mg by mouth daily as needed for Constipation. carvedilol (COREG) 6.25 mg tablet   Yes Yes   Sig: Take  by mouth two (2) times daily (with meals). cholecalciferol (VITAMIN D3) 1,000 unit cap   Yes Yes   Sig: Take 1,000 Units by mouth daily. ferrous sulfate 325 mg (65 mg iron) cpER   Yes No   Sig: Take  by mouth three (3) times daily. folic acid (FOLVITE) 1 mg tablet   Yes No   Sig: Take  by mouth daily.    furosemide (LASIX) 20 mg tablet   Yes Yes   Si mg by PEG Tube route daily. gabapentin (NEURONTIN) 100 mg capsule   Yes No   Sig: Take 300 mg by mouth daily. hydrALAZINE (APRESOLINE) 50 mg tablet   Yes Yes   Si mg by PEG Tube route three (3) times daily. insulin NPH (NOVOLIN N) 100 unit/mL injection   Yes Yes   Si Units by SubCUTAneous route every twelve (12) hours. isosorbide dinitrate (ISORDIL) 10 mg tablet   Yes Yes   Sig: Take  by mouth three (3) times daily. ketoconazole (NIZORAL) 2 % topical cream   Yes Yes   Sig: Apply  to affected area. Indications: Apply to affected area every 8 hours as needed for rash   levETIRAcetam (KEPPRA) 750 mg tablet   Yes Yes   Si mg by PEG Tube route two (2) times a day. levothyroxine (SYNTHROID) 75 mcg tablet   Yes Yes   Sig: Take 75 mcg by mouth Daily (before breakfast). nitroglycerin (NITROSTAT) 0.4 mg SL tablet   Yes No   Sig: by SubLINGual route every five (5) minutes as needed. potassium chloride (KLOR-CON) 20 mEq packet   Yes Yes   Sig: Take 20 mEq by mouth daily (with breakfast). raNITIdine hcl 150 mg capsule   Yes Yes   Sig: Take 150 mg by mouth two (2) times a day. sennosides (SENNA) 8.6 mg cap   Yes No   Sig: Take  by mouth.  Qd prn      Facility-Administered Medications: None          Thank you,  Ann Pressley  [employer] PharmD candidate 2018  Reviewed by         Reviewed the med list and informed Dr. Juventino Kline about the Novolin 14 units q 12 hours

## 2017-06-05 NOTE — CARDIO/PULMONARY
Cardiopulmonary Rehab:    Chart reviewed due to in basket referral. Pt is a 80 y.o. female admitted for Chest pain; Elevated troponin; ACS. PMH includes CVA, HTN, DM, CAD, Hypercholesterolemia, CABG, multiple PCI, ICH with dysphagia. Nonsmoker. On PEG feedings. Echo pending. Per cardiology: Clinically non cardiac pain. Most likely due to PEG tube. No cardiac teaching is indicated at this time.

## 2017-06-05 NOTE — ED NOTES
Assumed care of pt at this time. Pt states that about 3 pm today she started having sharp chest pain under her left breast that is a 3 out of 10 pain. Pt also has a non-productive congested cough. Assessment done at this time. Call bell in reach.

## 2017-06-05 NOTE — PROGRESS NOTES
0830 spoke with Dr Anirudh Puentes. He was calling to offer information in regards to pt's hx, stating that he has seen her many times over the past 5 yrs and has had \"countless admissions\" over that period of time. Unfortunately, the phone number that I wrote down, is not a valid number. Spoke with Dr Christiano Barrientos, who was quite interested in speaking with Dr Ileana Salvador- if he calls back  1330 pt returned from ECHO, [facility].  Repositioned for comfort  1340 DR Parry called back and provided # [phone]

## 2017-06-05 NOTE — PROGRESS NOTES
CM Initial Assessment        PMHX--  Significant for dm, htn, cad, s/p heart transplant, cva, hypercholesterolemeia. Current admission assessment-- Patient came to ed for complaint of chest pain, productive cough with white sputum and epigastric pain to left upper quadrant around her peg tube for few weeks. Patient is a long term resident at [facility] and [address] [place] [address]. She states she has two daughters and a son. Attempted to call her daughter Roque who is on the emergency contact but this is the wrong number. [facility] and spoke with Tai Gomez in admissions and he confirmed patient is long term patient there and they will accept her back when medically stable. He gave me the number home and cell for Roque and they were placed on the face sheet in [facility]. Daughter--Yi telephone number is [phone] cell number and her home number is [phone]. Attempted to call both numbers but no answer. Patient states she plans to go back to [address] [place] [address] when discharged. Referral sent via cc link to [address] [place] [address]. Care Management Interventions  PCP Verified by CM: Yes  Transition of Care Consult (CM Consult):  Other (Patient is long term care patient at [facility] and [address] [place] [address]  Confirm Follow Up Transport: Family  Plan discussed with Pt/Family/Caregiver: Yes  Discharge Location  Discharge Placement: [facility] ([facility])                   Mars [id number] [phone]

## 2017-06-05 NOTE — PROGRESS NOTES
Hospitalist Progress Note    NAME: Jhonny Ashby   :  1945   MRN:  [id number]         Assessment / Plan:  Chronic epigastric/chest pain with minimally elevated troponin in setting of CAD and remote ICH with dyphagia and chronic PEG tube:  - CT A/P  with no acute abnormality of the abdomen or pelvis. No cause for decreased hemoglobin demonstrated. Gallstones and nonobstructing left renal stones  - echo with EF 40-45%. There was hypokinesis of the basal-mid anteroseptal wall(s). There was moderate concentric hypertrophy.  - GI to eval PEG site, but does not appear to have acute intraabdominal process. Can f/u outpt. - appreciate cardiology consult, troponins have remained flat. Discussed at length with Pt's PCP Dr. Cary Love. Per Dr. Cary Love, Pt with known CAD and abnormal stress testing. She has had cardiac cath at [facility] however she has small vessel disease that is not amenable to further stenting and has been recommended for medical mgmt only for her CAD. Have discussed this with Dr. Carmelo Pride.  - increase bblocker to optimize cardiac management if HR tolerates. Con't ASA, lipitor, isordil. No ACE/ARB due to renal failure. - con't keppra for h/o ICH  DM2 with nephropathy (CKD3, at baseline) and anemia of CKD: no e/o bleeding  - restart NPH  - lispro sliding scale  - restart outpt iron supplement on discharge  HTN, benign/essential:  - con't norvasc, isordil, lasix, hydralazine  - increasing coreg as above  Hyperlipidemia:  con't lipitor  Hypothyroidism:  con't synthroid     Code Status: Full  Surrogate Decision Maker: daughter Candelario Boone  DVT Prophylaxis: lovenox     Baseline: Pt lives at [facility] as long term resident     Subjective:     Chief Complaint / Reason for Physician Visit  Complains of pain around PEG insertion site. No chest pain. Discussed with RN events overnight.      Review of Systems:  Symptom Y/N Comments  Symptom Y/N Comments   Fever/Chills n   Chest Pain n    Poor Appetite n   Edema n    Cough n   Abdominal Pain y    Sputum n   Joint Pain     SOB/MURCIA n   Pruritis/Rash     Nausea/vomit    Tolerating PT/OT     Diarrhea    Tolerating Diet     Constipation    Other       Could NOT obtain due to:      Objective:     VITALS:   Last 24hrs VS reviewed since prior progress note. Most recent are:  Patient Vitals for the past 24 hrs:   Temp Pulse Resp BP SpO2   06/05/17 0847 - 68 - 163/90 -   06/05/17 0737 98 °F (36.7 °C) 66 16 150/59 94 %   06/05/17 0241 - 70 - 160/47 98 %   06/05/17 0237 97.8 °F (36.6 °C) 73 15 175/74 98 %   06/05/17 0200 - 70 12 159/67 94 %   06/05/17 0145 - 66 12 146/67 92 %   06/05/17 0130 - 66 12 152/66 91 %   06/05/17 0115 - 67 (!) 6 147/63 92 %   06/05/17 0100 - 68 25 154/58 92 %   06/05/17 0030 - 68 14 150/61 92 %   06/05/17 0015 - 71 16 148/71 95 %   06/05/17 0001 - 74 17 147/65 97 %   06/04/17 2345 - 74 13 156/62 98 %   06/04/17 2330 - 73 15 150/63 94 %   06/04/17 2315 - 74 9 152/58 95 %   06/04/17 2300 - 74 (!) 0 146/55 95 %   06/04/17 2245 - 75 20 156/65 98 %   06/04/17 2230 - 76 16 155/60 98 %   06/04/17 2217 - 76 18 - 95 %   06/04/17 2216 - - - 138/74 -   06/04/17 2200 - 73 15 138/51 94 %   06/04/17 2145 - 73 16 142/56 95 %   06/04/17 2130 - 73 12 153/45 99 %   06/04/17 2127 - 74 13 144/83 100 %   06/04/17 2043 - 77 17 137/60 100 %   06/04/17 2015 - 75 8 139/56 100 %   06/04/17 2007 98.6 °F (37 °C) 76 16 139/57 100 %   06/04/17 2006 - - - 139/57 -     No intake or output data in the 24 hours ending 06/05/17 1037     PHYSICAL EXAM:  General: WD, WN. Alert, cooperative, no acute distress    EENT:  EOMI. Anicteric sclerae. MMM  Resp:  CTA bilaterally, no wheezing or rales. No accessory muscle use  CV:  Regular rhythm,  No edema  GI:  Soft, mildly distended, Non tender.  +Bowel sounds. PEG tube with mild surrounding erythema and exudate. No purulence.   Neurologic:  Alert and oriented X 3, +moderate dysarthria  Psych:   Some insight. Not anxious nor agitated  Skin:  No rashes. No jaundice    Reviewed most current lab test results and cultures  YES  Reviewed most current radiology test results   YES  Review and summation of old records today    NO  Reviewed patient's current orders and MAR    YES  PMH/SH reviewed - no change compared to H&P  ________________________________________________________________________  Care Plan discussed with:    Comments   Patient x    Family      RN x    Care Manager x    Consultant  x GI, cardiology, PCP                     Multidiciplinary team rounds were held today with , nursing, pharmacist and clinical coordinator. Patient's plan of care was discussed; medications were reviewed and discharge planning was addressed. ________________________________________________________________________  Total NON critical care TIME:  50 Minutes    Total CRITICAL CARE TIME Spent:   Minutes non procedure based      Comments   >50% of visit spent in counseling and coordination of care     ________________________________________________________________________  Yary Mitchell MD     Procedures: see electronic medical records for all procedures/Xrays and details which were not copied into this note but were reviewed prior to creation of Plan. LABS:  I reviewed today's most current labs and imaging studies.   Pertinent labs include:  Recent Labs      06/04/17 2034   WBC  7.9   HGB  8.8*   HCT  29.3*   PLT  221     Recent Labs      06/04/17 2034   NA  141   K  4.1   CL  107   CO2  31   GLU  125*   BUN  26*   CREA  1.15*   CA  9.2   ALB  3.3*   TBILI  0.3   SGOT  13*   ALT  18       Signed: Yary Mitchell MD

## 2017-06-05 NOTE — CONSULTS
GASTROENTEROLOGY CONSULTATION NOTE                            HOPE Bergeron MD  [facility], [address], [facility]  [address]  [phone]  [email or web address]        NAME:  Carmen Boo   :   1945   MRN:   [id number]   PCP: Marty Garcia MD  Date/Time:  2017 3:09 PM    Referring Physician: Daniel García MD    Consult Date: 2017 3:09 PM    Reason for consult: pain at PEG site                   Assessment:   PEG and PEG site look fine and are functioning. CT scan of abdomen shows PEG in good position and no issues with the tract. Chest pain not related to PEG         Plan:   May use PEG for meds and feedings. No further evaluation of PEG needed    I will be away for the next couple of days, so I will sign off. If help needed please call my office. History of Present Illness:  Patient is a 80 y.o. who is seen in consultation at the request of Dr. Kevin Landa  for pain around PEG site. She has had chronic PEG in same location placed at [facility] and has been replaced with balloon style PEG. She complains of chronic PEG around the PEG site. According to her PCP Dr. Kevin Blevins this has been a long standing complaint. During this admission she has had a CT scan of abdomen that shows the PEG is in good position. PMH:  Past Medical History:   Diagnosis Date    CAD (coronary artery disease)     Diabetes ([facility] [address]     Hypercholesterolemia 2014    Hypertension     Stroke [facility])        PSH:  Past Surgical History:   Procedure Laterality Date    CARDIAC SURG PROCEDURE UNLIST         Allergies:   Allergies   Allergen Reactions    Benadryl [Diphenhydramine Hcl] Not Reported This Time    Codeine Hives    Pcn [Penicillins] Hives    Sulfa (Sulfonamide Antibiotics) [facility]         Hospital Medications:  Current Facility-Administered Medications   Medication Dose Route Frequency    amLODIPine (NORVASC) tablet 10 mg  10 mg Per G Tube DAILY  aspirin delayed-release tablet 81 mg  81 mg Oral DAILY    atorvastatin (LIPITOR) tablet 40 mg  40 mg Oral DAILY    carvedilol (COREG) tablet 6.25 mg  6.25 mg Oral BID WITH MEALS    furosemide (LASIX) tablet 20 mg  20 mg Per G Tube DAILY    gabapentin (NEURONTIN) capsule 300 mg  300 mg Oral DAILY    hydrALAZINE (APRESOLINE) tablet 25 mg  25 mg Per G Tube TID    isosorbide dinitrate (ISORDIL) tablet 10 mg  10 mg Per G Tube TID    levETIRAcetam (KEPPRA) tablet 750 mg  750 mg Oral BID    levothyroxine (SYNTHROID) tablet 75 mcg  75 mcg Oral ACB    nitroglycerin (NITROSTAT) tablet 0.4 mg  0.4 mg SubLINGual PRN    senna (SENOKOT) tablet 8.6 mg  1 Tab Oral DAILY PRN    sodium chloride (NS) flush 5-10 mL  5-10 mL IntraVENous Q8H    sodium chloride (NS) flush 5-10 mL  5-10 mL IntraVENous PRN    enoxaparin (LOVENOX) injection 30 mg  30 mg SubCUTAneous Q24H    pantoprazole (PROTONIX) 40 mg in sodium chloride 0.9 % 10 mL injection  40 mg IntraVENous DAILY    acetaminophen (TYLENOL) tablet 650 mg  650 mg Oral Q6H PRN    ketorolac (TORADOL) injection 15 mg  15 mg IntraVENous Q6H PRN    ondansetron (ZOFRAN) injection 4 mg  4 mg IntraVENous Q6H PRN    metoprolol (LOPRESSOR) injection 5 mg  5 mg IntraVENous Q6H PRN       Home Medications:  Prior to Admission Medications   Prescriptions Last Dose Informant Patient Reported? Taking? Aspirin, Buffered 81 mg tab   Yes No   Sig: Take  by mouth. acetaminophen (TYLENOL) 325 mg tablet   Yes No   Sig: Take  by mouth every four (4) hours as needed for Pain. amLODIPine (NORVASC) 10 mg tablet   Yes Yes   Sig: 10 mg by PEG Tube route daily. aspirin delayed-release (ASPIR-LOW) 81 mg tablet   Yes No   Sig: Take 81 mg by mouth daily. atorvastatin (LIPITOR) 40 mg tablet   Yes Yes   Sig: Take 40 mg by mouth daily. bisacodyl (DULCOLAX, BISACODYL,) 5 mg EC tablet   Yes No   Sig: Take 5 mg by mouth daily as needed for Constipation.    carvedilol (COREG) 6.25 mg tablet Yes Yes   Sig: Take  by mouth two (2) times daily (with meals). cholecalciferol (VITAMIN D3) 1,000 unit cap   Yes Yes   Sig: Take 1,000 Units by mouth daily. ferrous sulfate 325 mg (65 mg iron) cpER   Yes No   Sig: Take  by mouth three (3) times daily. folic acid (FOLVITE) 1 mg tablet   Yes No   Sig: Take  by mouth daily. furosemide (LASIX) 20 mg tablet   Yes Yes   Si mg by PEG Tube route daily. gabapentin (NEURONTIN) 100 mg capsule   Yes No   Sig: Take 300 mg by mouth daily. hydrALAZINE (APRESOLINE) 50 mg tablet   Yes Yes   Si mg by PEG Tube route three (3) times daily. insulin NPH (NOVOLIN N) 100 unit/mL injection   Yes Yes   Si Units by SubCUTAneous route every twelve (12) hours. isosorbide dinitrate (ISORDIL) 10 mg tablet   Yes Yes   Sig: Take  by mouth three (3) times daily. ketoconazole (NIZORAL) 2 % topical cream   Yes Yes   Sig: Apply  to affected area. Indications: Apply to affected area every 8 hours as needed for rash   levETIRAcetam (KEPPRA) 750 mg tablet   Yes Yes   Si mg by PEG Tube route two (2) times a day. levothyroxine (SYNTHROID) 75 mcg tablet   Yes Yes   Sig: Take 75 mcg by mouth Daily (before breakfast). nitroglycerin (NITROSTAT) 0.4 mg SL tablet   Yes No   Sig: by SubLINGual route every five (5) minutes as needed. potassium chloride (KLOR-CON) 20 mEq packet   Yes Yes   Sig: Take 20 mEq by mouth daily (with breakfast). raNITIdine hcl 150 mg capsule   Yes Yes   Sig: Take 150 mg by mouth two (2) times a day. sennosides (SENNA) 8.6 mg cap   Yes No   Sig: Take  by mouth.  Qd prn      Facility-Administered Medications: None       Social History:  Social History   Substance Use Topics    Smoking status: Never Smoker    Smokeless tobacco: Not on file    Alcohol use No       Family History:  Family History   Problem Relation Age of Onset    Stroke Mother     Stroke Father     Diabetes Father            Objective:   Patient Vitals for the past 8 hrs:   BP Temp Pulse Resp SpO2 Height Weight   06/05/17 1453 156/69 99.2 °F (37.3 °C) 80 18 95 % - -   06/05/17 1448 - - - - - 5' 2\" (1.575 m) 68.9 kg (152 lb)   06/05/17 1110 - - 71 16 93 % - -   06/05/17 0847 163/90 - 68 - - - -   06/05/17 0737 150/59 98 °F (36.7 °C) 66 16 94 % - -             EXAM:     NEURO-a&o   HEENT-wnl   LUNGS-clear    COR-regular rate and rhythym     ABD-soft , no tenderness, PEG site looks fine, no sign of infection. PEG slides in and out of the stoma easily. Data Review     Recent Labs      06/04/17 2034   WBC  7.9   HGB  8.8*   HCT  29.3*   PLT  221     Recent Labs      06/04/17 2034   NA  141   K  4.1   CL  107   CO2  31   BUN  26*   CREA  1.15*   GLU  125*   CA  9.2     Recent Labs      06/04/17 2034   SGOT  13*   AP  90   TP  7.0   ALB  3.3*   GLOB  3.7   LPSE  145     No results for input(s): INR, PTP, APTT in the last 72 hours.     No lab exists for component: INREXT     IMAGING RESULTS:   []      I have personally reviewed the actual   []    CXR  [x]    CT  []     [address] discussed with:    [x]    Patient   []    Family   []    Nursing   [x]    Attending    Marleni Driver MD

## 2017-06-05 NOTE — WOUND CARE
Pressure Ulcer Prevention In basket Alert Received for Antoine < 14 (moderate risk).      Suggested Care Plan/Interventions for Nursing  1. Complete Antoine Pressure Ulcer Risk Scale and use sub scores to identify appropriate interventions. 2. Perform Assessment: skin, changes in LOC, visual cues for pain, monitor skin under medical devices  3. Respond to Reduced Sensory Perception: changes in LOC, check visual cues for pain, float heels, suspension boots, pressure redistribution bed/mattress/chair cushion, turning and reposition approximately every 2 hours (pillows & wedges), pad between skin to skin, turn & reposition  4. Manage Moisture: absorbent under pads, internal / external urinary device, internal /  external fecal device, minimize layers, contain wound drainage, access need for specialty bed, limit adult briefs, maintain skin hydration (lotion/cream), moisture barrier, offer toileting every hour  5. Promote Activity: increase time out of bed, chair cushion, PT/OT evaluation, trapeze to reposition, pressure redistribution bed/mattress/chair  6. Address Reduced Mobility: float heels / suspension boot, HOB 30 degrees or less, pressure redistribution bed/mattress/cushion, PT / OT evaluation, turn and reposition approximately every 2 hours (pillows & wedges)  7. Promote Nutrition: document food / fluid / supplement intake, encourage/assist with meals as needed  8. Reduce Friction and Shear: transferring/repositioning devices (lift/draw sheet), lift team/ patient mobility team, feet elevated on foot rest, minimize layers, foam dressing / transparent film / skin sealants, protective barrier creams and emollients, transfer aides (board, Tricia lift, ceiling lift, stand assist), HOB 30 degrees or less, trapeze to reposition.   Wound Care Team

## 2017-06-05 NOTE — PROGRESS NOTES
Primary Nurse Catia Ho and Cristhian Mauro RN performed a dual skin assessment on this patient No impairment noted  Antoine score is 16

## 2017-06-05 NOTE — ED PROVIDER NOTES
HPI Comments: Clary Bobo is a 80 y.o. female, pmhx significant for DM, HTN, CAD s/p heart transplant, CVA, hypercholesterolmeia, who presents via EMS to the ED c/o waxing and waning 8/10 CP, productive cough with white sputum and epigastric pain radiating to the LUQ around her NG tube x a few weeks. Pt states that she feels ok upon arrival to the ED except for her cough. Per EMS, she was given 3 baby ASA and 3 SL NTG at [facility] and 1 ASA en route. Pt specifically denies orthopnea, home O2, nausea or vomiting. PCP: Sulma Sanz MD      Social Hx: -tobacco, -EtOH, -Illicit Drugs   FHx: no pertinent family hx   Medication Allergies: codeine, penicillins, sulfa      There are no other complaints, changes, or physical findings at this time. The history is provided by the patient and the EMS personnel. Past Medical History:   Diagnosis Date    CAD (coronary artery disease)     Diabetes ([facility] [address]     Hypercholesterolemia 1/30/2014    Hypertension     Stroke [facility])        Past Surgical History:   Procedure Laterality Date    CARDIAC SURG PROCEDURE UNLIST           Family History:   Problem Relation Age of Onset    Stroke Mother     Stroke Father     Diabetes Father        Social History     Social History    Marital status:      Spouse name: N/A    Number of children: N/A    Years of education: N/A     Occupational History    Not on file. Social History Main Topics    Smoking status: Never Smoker    Smokeless tobacco: Not on file    Alcohol use No    Drug use: No    Sexual activity: Not on file     Other Topics Concern    Not on file     Social History Narrative         ALLERGIES: Codeine; Pcn [penicillins]; and Sulfa (sulfonamide antibiotics)    Review of Systems   Constitutional: Negative. Negative for appetite change, chills, fatigue and fever. HENT: Negative. Negative for congestion, rhinorrhea, sinus pressure and sore throat. Eyes: Negative. Respiratory: Positive for cough (productive, white sputum). Negative for choking, chest tightness, shortness of breath (no orthopnea) and wheezing. Cardiovascular: Positive for chest pain. Negative for palpitations and leg swelling. Gastrointestinal: Positive for abdominal pain (epigastric around the NG tube radiating to the LUQ). Negative for constipation, diarrhea, nausea and vomiting. Endocrine: Negative. Genitourinary: Negative. Negative for difficulty urinating, dysuria, flank pain and urgency. Musculoskeletal: Negative. Skin: Negative. Neurological: Negative. Negative for dizziness, speech difficulty, weakness, light-headedness, numbness and headaches. Psychiatric/Behavioral: Negative. All other systems reviewed and are negative. Patient Vitals for the past 12 hrs:   Temp Pulse Resp BP SpO2   06/04/17 2217 - 76 18 - 95 %   06/04/17 2216 - - - 138/74 -   06/04/17 2200 - 73 15 138/51 94 %   06/04/17 2145 - 73 16 142/56 95 %   06/04/17 2130 - 73 12 153/45 99 %   06/04/17 2127 - 74 13 144/83 100 %   06/04/17 2043 - 77 17 137/60 100 %   06/04/17 2015 - 75 8 139/56 100 %   06/04/17 2007 98.6 °F (37 °C) 76 16 139/57 100 %   06/04/17 2006 - - - 139/57 -       Physical Exam   Constitutional: She is oriented to person, place, and time. She appears well-developed and well-nourished. No distress. HENT:   Head: Normocephalic and atraumatic. Mouth/Throat: Oropharynx is clear and moist. No oropharyngeal exudate. Eyes: Conjunctivae and EOM are normal. Pupils are equal, round, and reactive to light. Neck: Normal range of motion. Neck supple. No JVD present. No tracheal deviation present. Cardiovascular: Normal rate, regular rhythm, normal heart sounds and intact distal pulses. No murmur heard. Pulmonary/Chest: Effort normal and breath sounds normal. No stridor. No respiratory distress. She has no wheezes. She has no rales. She exhibits tenderness. Abdominal: Soft.  She exhibits no distension. There is no tenderness. There is no rebound and no guarding. Feeding tube present- no surrounding erythema   Musculoskeletal: Normal range of motion. She exhibits no edema or tenderness. Neurological: She is alert and oriented to person, place, and time. No cranial nerve deficit. No gross motor or sensory deficits, weakness and contracture from prior CVA, no acute deficits   Skin: Skin is warm and dry. She is not diaphoretic. Psychiatric: She has a normal mood and affect. Her behavior is normal.   Nursing note and vitals reviewed. MDM  Number of Diagnoses or Management Options  Abdominal pain, epigastric:   Anemia, unspecified type:   Elevated troponin:   Diagnosis management comments: DDx: heart failure, ACS, pneumonia, anemia, GERD       Amount and/or Complexity of Data Reviewed  Clinical lab tests: reviewed and ordered  Tests in the radiology section of CPT®: reviewed and ordered  Tests in the medicine section of CPT®: ordered and reviewed  Obtain history from someone other than the patient: yes (EMS)  Review and summarize past medical records: yes  Discuss the patient with other providers: yes (Cardiology, hospitalist )  Independent visualization of images, tracings, or specimens: yes    Patient Progress  Patient progress: stable    ED Course       Procedures     EKG- Sinus rate 76, normal axis/qrs, q waves inferior, no acute ST-T wave changes, Serena Poster, DO    Procedure Note - Rectal Exam:   10:23 PM  Performed by: Catie Salazar DO  Chaperoned by: Mortimer Muir, RN  Rectal exam performed. No stool in the vault. Large extrenal hemorrhoid  The procedure took 1-15 minutes, and pt tolerated well. Written by Juancho Martinez ED Scribe, as dictated by Catie Salazar DO.       Pt with slightly elevated troponin, per pt she was told by nursing staff at her facility that there had been a concern for blood in stool, previous labs available at the time in ED from several years ago, at that time Hgb 11, unclear if today's Hgb an acute drop or from chronic disease, shreyas admit for further evaluation. Brent Real DO    CONSULT NOTE:   10:58 PM  Chavez Pike DO spoke with Dr. Jean Paul Perez,   Specialty: Cardiology  Discussed pt's hx, disposition, and available diagnostic and imaging results. Reviewed care plans. Consultant  Agrees with plan. Written by Moni Costa ED Scribe, as dictated by Chavez Pike DO.      CONSULT NOTE:   11:00 PM  Chavez Pike DO spoke with Dr. Monika Acevedo,   Specialty: Hospitalist  Discussed pt's hx, disposition, and available diagnostic and imaging results. Reviewed care plans. Consultant will evaluate pt for admission. Written by Moni Costa ED Scribe, as dictated by Chavez Pike DO.      ADMIT NOTE:  LABORATORY TESTS:  Recent Results (from the past 12 hour(s))   EKG, 12 LEAD, INITIAL    Collection Time: 06/04/17  8:32 PM   Result Value Ref Range    Ventricular Rate 76 BPM    Atrial Rate 76 BPM    QRS Duration 74 ms    Q-T Interval 444 ms    QTC Calculation (Bezet) 499 ms    Calculated R Axis -16 degrees    Calculated T Axis 71 degrees    Diagnosis       Accelerated Junctional rhythm  Inferior infarct (cited on or before 29-APR-2010)  When compared with ECG of 29-APR-2010 16:19,  Junctional rhythm has replaced Sinus rhythm  Criteria for Anterior infarct are no longer present  Criteria for Anterolateral infarct are no longer present     CBC WITH AUTOMATED DIFF    Collection Time: 06/04/17  8:34 PM   Result Value Ref Range    WBC 7.9 3.6 - 11.0 K/uL    RBC 4.03 3.80 - 5.20 M/uL    HGB 8.8 (L) 11.5 - 16.0 g/dL    HCT 29.3 (L) 35.0 - 47.0 %    MCV 72.7 (L) 80.0 - 99.0 FL    MCH 21.8 (L) 26.0 - 34.0 PG    MCHC 30.0 30.0 - 36.5 g/dL    RDW 18.4 (H) 11.5 - 14.5 %    PLATELET 175 204 - 626 K/uL    NEUTROPHILS 77 (H) 32 - 75 %    LYMPHOCYTES 13 12 - 49 %    MONOCYTES 8 5 - 13 %    EOSINOPHILS 1 0 - 7 %    BASOPHILS 1 0 - 1 %    ABS.  NEUTROPHILS 6.1 1.8 - 8.0 K/UL    ABS. LYMPHOCYTES 1.0 0.8 - 3.5 K/UL    ABS. MONOCYTES 0.6 0.0 - 1.0 K/UL    ABS. EOSINOPHILS 0.1 0.0 - 0.4 K/UL    ABS. BASOPHILS 0.1 0.0 - 0.1 K/UL    DF SMEAR SCANNED      RBC COMMENTS ANISOCYTOSIS  1+        RBC COMMENTS MICROCYTOSIS  1+        RBC COMMENTS OVALOCYTES  PRESENT       METABOLIC PANEL, COMPREHENSIVE    Collection Time: 06/04/17  8:34 PM   Result Value Ref Range    Sodium 141 136 - 145 mmol/L    Potassium 4.1 3.5 - 5.1 mmol/L    Chloride 107 97 - 108 mmol/L    CO2 31 21 - 32 mmol/L    Anion gap 3 (L) 5 - 15 mmol/L    Glucose 125 (H) 65 - 100 mg/dL    BUN 26 (H) 6 - 20 MG/DL    Creatinine 1.15 (H) 0.55 - 1.02 MG/DL    BUN/Creatinine ratio 23 (H) 12 - 20      GFR est AA 57 (L) >60 ml/min/1.73m2    GFR est non-AA 47 (L) >60 ml/min/1.73m2    Calcium 9.2 8.5 - 10.1 MG/DL    Bilirubin, total 0.3 0.2 - 1.0 MG/DL    ALT (SGPT) 18 12 - 78 U/L    AST (SGOT) 13 (L) 15 - 37 U/L    Alk. phosphatase 90 45 - 117 U/L    Protein, total 7.0 6.4 - 8.2 g/dL    Albumin 3.3 (L) 3.5 - 5.0 g/dL    Globulin 3.7 2.0 - 4.0 g/dL    A-G Ratio 0.9 (L) 1.1 - 2.2     TROPONIN I    Collection Time: 06/04/17  8:34 PM   Result Value Ref Range    Troponin-I, Qt. 0.24 (H) <0.05 ng/mL   CK W/ REFLX CKMB    Collection Time: 06/04/17  8:34 PM   Result Value Ref Range     (H) 26 - 192 U/L   CK-MB,QUANT. Collection Time: 06/04/17  8:34 PM   Result Value Ref Range    CK - MB <1.0 <3.6 NG/ML    CK-MB Index Cannot be calulated 0 - 2.5         IMAGING RESULTS:  CT Results  (Last 48 hours)               06/04/17 2218  CT ABD PELV WO CONT Final result    Impression:  IMPRESSION:       1. No acute abnormality of the abdomen or pelvis. No cause for decreased   hemoglobin demonstrated   2. Gallstones and nonobstructing left renal stones           Narrative:  INDICATION: abdominal pain, feeding tube in place, drop in hemiglobin       COMPARISON: None       TECHNIQUE:    Thin axial images were obtained through the abdomen and pelvis. Coronal and   sagittal reconstructions were generated. Oral contrast was not administered. CT   dose reduction was achieved through use of a standardized protocol tailored for   this examination and automatic exposure control for dose modulation. The absence of intravenous contrast material reduces the sensitivity for   evaluation of the solid parenchymal organs of the abdomen. FINDINGS:    LUNG BASES: There is atelectasis at the lung bases   INCIDENTALLY IMAGED HEART AND MEDIASTINUM: Heart is enlarged   LIVER: No mass or biliary dilatation. GALLBLADDER: There are gallstones in a nondistended gallbladder   SPLEEN: No mass. PANCREAS: No mass or ductal dilatation. ADRENALS: Unremarkable. KIDNEYS/URETERS: There are nonobstructing left renal stones. Right kidney is   atrophied. No hydronephrosis   STOMACH: PEG tube is positioned within the stomach. SMALL BOWEL: No dilatation or wall thickening. COLON: No dilatation or wall thickening. APPENDIX: Unremarkable. PERITONEUM: No ascites or pneumoperitoneum. RETROPERITONEUM: No pathologic adenopathy. There are vascular calcifications. Infrarenal abdominal aorta measures 3.3 cm   REPRODUCTIVE ORGANS: Uterus is surgically absent   URINARY BLADDER: No mass or calculus. BONES: Patient is osteopenic with levoconvex scoliosis   ADDITIONAL COMMENTS: N/A               CXR Results  (Last 48 hours)               06/04/17 2056  XR CHEST PA LAT Final result    Impression:  IMPRESSION: No acute process           Narrative:  INDICATION:  chest pain/cough/congestion        COMPARISON: 4/29/2010       FINDINGS: PA and lateral views of the chest demonstrate a stable   cardiomediastinal silhouette and clear lungs bilaterally. There is chronic   cardiomegaly. There are median sternotomy wires. The visualized osseous   structures are unremarkable.                  MEDICATIONS GIVEN:  Medications   pantoprazole (PROTONIX) injection 40 mg (40 mg IntraVENous Given 6/4/17 4239)       IMPRESSION:  1. Abdominal pain, epigastric    2. Elevated troponin        PLAN: Admit to Hospitalist    11:02 PM  Patient is being admitted to the hospital by Dr. Helena Monroe. The results of their tests and reasons for their admission have been discussed with them and/or available family. They convey agreement and understanding for the need to be admitted and for their admission diagnosis. Written by Kadi Burrell, ED Scribe, as dictated by Zachary Merrill DO. This note is prepared by Nayely Ramos acting as scribe for Zachary Merrill, [address] [place] [address], DO : The scribe's documentation has been prepared under my direction and personally reviewed by me in its entirety. I confirm that the note above accurately reflects all work, treatment, procedures, and medical decision making performed by me.

## 2017-06-05 NOTE — ED NOTES
TRANSFER - OUT REPORT:    Verbal report given to Destiny Sun RN(name) on Cinthya Singleton  being transferred to PCU RM 2240(unit) for routine progression of care       Report consisted of patients Situation, Background, Assessment and   Recommendations(SBAR). Information from the following report(s) SBAR, Kardex, ED Summary, Procedure Summary, Intake/Output, MAR, Recent Results and Cardiac Rhythm SR was reviewed with the receiving nurse. Lines:   Peripheral IV 06/04/17 Left Forearm (Active)   Site Assessment Clean, dry, & intact 6/4/2017  8:49 PM   Phlebitis Assessment 0 6/4/2017  8:49 PM   Infiltration Assessment 0 6/4/2017  8:49 PM   Dressing Status Clean, dry, & intact 6/4/2017  8:49 PM   Dressing Type Tape;Transparent 6/4/2017  8:49 PM   Hub Color/Line Status Pink;Flushed;Patent 6/4/2017  8:49 PM        Opportunity for questions and clarification was provided.       Patient transported with:   Registered Nurse  Tech

## 2017-06-05 NOTE — CONSULTS
Subjective:      Date of  Admission: 6/4/2017  7:16 PM     Admission type:Emergency    Carmen Boo is a 80 y.o. female admitted for Chest pain;Elevated troponin;ACS (acute coronary syndrome) *. Per pt she has been experiencing epigastric pain around PEG tube for last couple of months. Denies any associated symptoms of SOB, palpitations, diaphoresis. Cardiology consult requested for non specific elevated troponin. Not a good historian.      Patient Active Problem List    Diagnosis Date Noted    Chest pain 06/05/2017    Anemia 06/05/2017    ACS (acute coronary syndrome) ([facility] 06/05/2017    Elevated troponin 06/05/2017    CVA (cerebral infarction) 01/30/2014    Hypercholesterolemia 01/30/2014    CAD (coronary artery disease) 04/30/2012    DM (diabetes mellitus) ([facility] 04/30/2012    HTN (hypertension) 04/30/2012    UTI (lower urinary tract infection) 04/30/2012    Rotator cuff tendinitis 04/30/2012      Marty Garcia MD  Past Medical History:   Diagnosis Date    CAD (coronary artery disease)     Diabetes ([facility]     Hypercholesterolemia 1/30/2014    Hypertension     Stroke [facility])       Past Surgical History:   Procedure Laterality Date    CARDIAC SURG PROCEDURE UNLIST       Allergies   Allergen Reactions    Codeine Hives    Pcn [Penicillins] Hives    Sulfa (Sulfonamide Antibiotics) Hives      Family History   Problem Relation Age of Onset    Stroke Mother     Stroke Father     Diabetes Father       Current Facility-Administered Medications   Medication Dose Route Frequency    amLODIPine (NORVASC) tablet 10 mg  10 mg Per G Tube DAILY    aspirin delayed-release tablet 81 mg  81 mg Oral DAILY    atorvastatin (LIPITOR) tablet 40 mg  40 mg Oral DAILY    carvedilol (COREG) tablet 6.25 mg  6.25 mg Oral BID WITH MEALS    furosemide (LASIX) tablet 20 mg  20 mg Per G Tube DAILY    gabapentin (NEURONTIN) capsule 300 mg  300 mg Oral DAILY    hydrALAZINE (APRESOLINE) tablet 25 mg 25 mg Per G Tube TID    . PHARMACY TO SUBSTITUTE PER PROTOCOL    Per Protocol    isosorbide dinitrate (ISORDIL) tablet 10 mg  10 mg Per G Tube TID    levETIRAcetam (KEPPRA) tablet 750 mg  750 mg Oral BID    levothyroxine (SYNTHROID) tablet 75 mcg  75 mcg Oral ACB    nitroglycerin (NITROSTAT) tablet 0.4 mg  0.4 mg SubLINGual PRN    senna (SENOKOT) tablet 8.6 mg  1 Tab Oral DAILY PRN    sodium chloride (NS) flush 5-10 mL  5-10 mL IntraVENous Q8H    sodium chloride (NS) flush 5-10 mL  5-10 mL IntraVENous PRN    enoxaparin (LOVENOX) injection 30 mg  30 mg SubCUTAneous Q24H    pantoprazole (PROTONIX) 40 mg in sodium chloride 0.9 % 10 mL injection  40 mg IntraVENous DAILY         Review of Symptoms:  Constitutional: negative  Eyes: negative  Ears, nose, mouth, throat, and face: negative  Respiratory: No exertional dyspnea, orthopnea, PND, cough, hemoptysis, URI. Cardiovascular: No palpitations, sweating, lightheadedness, dizziness, syncope, presyncope, lower extremity swelling. Gastrointestinal: No nausea, vomiting, diarrhea, constipation, abdominal pain, hematemesis, melena, hematochezia  Genitourinary:No urinary complaints. Musculoskeletal:negative  Neurological: negative  Behvioral/Psych: negative  Endocrine: negative     Subjective:      Visit Vitals    /59 (BP 1 Location: Left arm, BP Patient Position: At rest)    Pulse 66    Temp 98 °F (36.7 °C)    Resp 16    SpO2 94%    Breastfeeding No       Physical Exam  Abdomen: soft, non-tender.  Bowel sounds normal.   Extremities: no cyanosis or edema  Heart: regular rate and rhythm, S1, S2 normal, no murmur, click, rub or gallop  Lungs: clear to auscultation bilaterally  Neck: supple, no carotid bruit and no JVD    Cardiographics    Telemetry: normal sinus rhythm    ECG: normal sinus rhythm, old inferior infarct, unchanged, PAC    Echocardiogram: Not done    Labs:   Recent Results (from the past 24 hour(s))   EKG, 12 LEAD, INITIAL    Collection Time: 06/04/17  8:32 PM   Result Value Ref Range    Ventricular Rate 76 BPM    Atrial Rate 76 BPM    QRS Duration 74 ms    Q-T Interval 444 ms    QTC Calculation (Bezet) 499 ms    Calculated R Axis -16 degrees    Calculated T Axis 71 degrees    Diagnosis       Accelerated Junctional rhythm  Inferior infarct (cited on or before 29-APR-2010)  When compared with ECG of 29-APR-2010 16:19,  Junctional rhythm has replaced Sinus rhythm  Criteria for Anterior infarct are no longer present  Criteria for Anterolateral infarct are no longer present     CBC WITH AUTOMATED DIFF    Collection Time: 06/04/17  8:34 PM   Result Value Ref Range    WBC 7.9 3.6 - 11.0 K/uL    RBC 4.03 3.80 - 5.20 M/uL    HGB 8.8 (L) 11.5 - 16.0 g/dL    HCT 29.3 (L) 35.0 - 47.0 %    MCV 72.7 (L) 80.0 - 99.0 FL    MCH 21.8 (L) 26.0 - 34.0 PG    MCHC 30.0 30.0 - 36.5 g/dL    RDW 18.4 (H) 11.5 - 14.5 %    PLATELET 099 567 - 072 K/uL    NEUTROPHILS 77 (H) 32 - 75 %    LYMPHOCYTES 13 12 - 49 %    MONOCYTES 8 5 - 13 %    EOSINOPHILS 1 0 - 7 %    BASOPHILS 1 0 - 1 %    ABS. NEUTROPHILS 6.1 1.8 - 8.0 K/UL    ABS. LYMPHOCYTES 1.0 0.8 - 3.5 K/UL    ABS. MONOCYTES 0.6 0.0 - 1.0 K/UL    ABS. EOSINOPHILS 0.1 0.0 - 0.4 K/UL    ABS.  BASOPHILS 0.1 0.0 - 0.1 K/UL    DF SMEAR SCANNED      RBC COMMENTS ANISOCYTOSIS  1+        RBC COMMENTS MICROCYTOSIS  1+        RBC COMMENTS OVALOCYTES  PRESENT       METABOLIC PANEL, COMPREHENSIVE    Collection Time: 06/04/17  8:34 PM   Result Value Ref Range    Sodium 141 136 - 145 mmol/L    Potassium 4.1 3.5 - 5.1 mmol/L    Chloride 107 97 - 108 mmol/L    CO2 31 21 - 32 mmol/L    Anion gap 3 (L) 5 - 15 mmol/L    Glucose 125 (H) 65 - 100 mg/dL    BUN 26 (H) 6 - 20 MG/DL    Creatinine 1.15 (H) 0.55 - 1.02 MG/DL    BUN/Creatinine ratio 23 (H) 12 - 20      GFR est AA 57 (L) >60 ml/min/1.73m2    GFR est non-AA 47 (L) >60 ml/min/1.73m2    Calcium 9.2 8.5 - 10.1 MG/DL    Bilirubin, total 0.3 0.2 - 1.0 MG/DL    ALT (SGPT) 18 12 - 78 U/L    AST (SGOT) 13 (L) 15 - 37 U/L    Alk. phosphatase 90 45 - 117 U/L    Protein, total 7.0 6.4 - 8.2 g/dL    Albumin 3.3 (L) 3.5 - 5.0 g/dL    Globulin 3.7 2.0 - 4.0 g/dL    A-G Ratio 0.9 (L) 1.1 - 2.2     TROPONIN I    Collection Time: 06/04/17  8:34 PM   Result Value Ref Range    Troponin-I, Qt. 0.24 (H) <0.05 ng/mL   CK W/ REFLX CKMB    Collection Time: 06/04/17  8:34 PM   Result Value Ref Range     (H) 26 - 192 U/L   CK-MB,QUANT. Collection Time: 06/04/17  8:34 PM   Result Value Ref Range    CK - MB <1.0 <3.6 NG/ML    CK-MB Index Cannot be calulated 0 - 2.5     LIPASE    Collection Time: 06/04/17  8:34 PM   Result Value Ref Range    Lipase 145 73 - 393 U/L   TROPONIN I    Collection Time: 06/05/17  2:09 AM   Result Value Ref Range    Troponin-I, Qt. 0.23 (H) <0.05 ng/mL        Assessment:     Assessment:       Active Problems:    Chest pain (6/5/2017)      Anemia (6/5/2017)      ACS (acute coronary syndrome) ([facility] (6/5/2017)      Elevated troponin (6/5/2017)         Plan:     1. Non specific troponin. No EKG changes. Clinically non cardiac pain. Most likely due to PEG tube. Echo today. Due to previous h/o PCI, will get stress test at some point. 2. CAD, multiple PCI, CABG: poor f/u. Continue current meds.    3. Anemia: per hospitalist.

## 2017-06-06 VITALS
HEART RATE: 80 BPM | DIASTOLIC BLOOD PRESSURE: 60 MMHG | HEIGHT: 62 IN | TEMPERATURE: 98.4 F | WEIGHT: 152 LBS | OXYGEN SATURATION: 98 % | BODY MASS INDEX: 27.97 KG/M2 | RESPIRATION RATE: 16 BRPM | SYSTOLIC BLOOD PRESSURE: 146 MMHG

## 2017-06-06 LAB
ANION GAP BLD CALC-SCNC: 4 MMOL/L (ref 5–15)
BACTERIA SPEC CULT: ABNORMAL
BACTERIA SPEC CULT: ABNORMAL
BUN SERPL-MCNC: 27 MG/DL (ref 6–20)
BUN/CREAT SERPL: 24 (ref 12–20)
CALCIUM SERPL-MCNC: 9 MG/DL (ref 8.5–10.1)
CHLORIDE SERPL-SCNC: 106 MMOL/L (ref 97–108)
CO2 SERPL-SCNC: 29 MMOL/L (ref 21–32)
CREAT SERPL-MCNC: 1.12 MG/DL (ref 0.55–1.02)
ERYTHROCYTE [DISTWIDTH] IN BLOOD BY AUTOMATED COUNT: 18.2 % (ref 11.5–14.5)
GLUCOSE BLD STRIP.AUTO-MCNC: 152 MG/DL (ref 65–100)
GLUCOSE SERPL-MCNC: 132 MG/DL (ref 65–100)
HCT VFR BLD AUTO: 29 % (ref 35–47)
HGB BLD-MCNC: 8.6 G/DL (ref 11.5–16)
MAGNESIUM SERPL-MCNC: 2.4 MG/DL (ref 1.6–2.4)
MCH RBC QN AUTO: 21.4 PG (ref 26–34)
MCHC RBC AUTO-ENTMCNC: 29.7 G/DL (ref 30–36.5)
MCV RBC AUTO: 72.3 FL (ref 80–99)
PLATELET # BLD AUTO: 196 K/UL (ref 150–400)
POTASSIUM SERPL-SCNC: 3.6 MMOL/L (ref 3.5–5.1)
RBC # BLD AUTO: 4.01 M/UL (ref 3.8–5.2)
SERVICE CMNT-IMP: ABNORMAL
SERVICE CMNT-IMP: ABNORMAL
SODIUM SERPL-SCNC: 139 MMOL/L (ref 136–145)
TROPONIN I SERPL-MCNC: 0.21 NG/ML
WBC # BLD AUTO: 8.7 K/UL (ref 3.6–11)

## 2017-06-06 PROCEDURE — 36415 COLL VENOUS BLD VENIPUNCTURE: CPT | Performed by: INTERNAL MEDICINE

## 2017-06-06 PROCEDURE — 84484 ASSAY OF TROPONIN QUANT: CPT | Performed by: INTERNAL MEDICINE

## 2017-06-06 PROCEDURE — 74011250637 HC RX REV CODE- 250/637: Performed by: INTERNAL MEDICINE

## 2017-06-06 PROCEDURE — 82962 GLUCOSE BLOOD TEST: CPT

## 2017-06-06 PROCEDURE — 83735 ASSAY OF MAGNESIUM: CPT | Performed by: INTERNAL MEDICINE

## 2017-06-06 PROCEDURE — 74011250636 HC RX REV CODE- 250/636: Performed by: INTERNAL MEDICINE

## 2017-06-06 PROCEDURE — 74011636637 HC RX REV CODE- 636/637: Performed by: INTERNAL MEDICINE

## 2017-06-06 PROCEDURE — 80048 BASIC METABOLIC PNL TOTAL CA: CPT | Performed by: INTERNAL MEDICINE

## 2017-06-06 PROCEDURE — 85027 COMPLETE CBC AUTOMATED: CPT | Performed by: INTERNAL MEDICINE

## 2017-06-06 RX ORDER — INSULIN LISPRO 100 [IU]/ML
INJECTION, SOLUTION INTRAVENOUS; SUBCUTANEOUS EVERY 12 HOURS
Status: DISCONTINUED | OUTPATIENT
Start: 2017-06-06 | End: 2017-06-06 | Stop reason: HOSPADM

## 2017-06-06 RX ORDER — GUAIFENESIN 100 MG/5ML
81 LIQUID (ML) ORAL DAILY
Qty: 30 TAB | Refills: 0 | Status: SHIPPED
Start: 2017-06-06 | End: 2017-07-06

## 2017-06-06 RX ORDER — CARVEDILOL 6.25 MG/1
12.5 TABLET ORAL 2 TIMES DAILY WITH MEALS
Qty: 120 TAB | Refills: 0 | Status: SHIPPED
Start: 2017-06-06 | End: 2017-07-06

## 2017-06-06 RX ORDER — MAGNESIUM SULFATE 100 %
4 CRYSTALS MISCELLANEOUS AS NEEDED
Status: DISCONTINUED | OUTPATIENT
Start: 2017-06-06 | End: 2017-06-06 | Stop reason: HOSPADM

## 2017-06-06 RX ORDER — DEXTROSE 50 % IN WATER (D50W) INTRAVENOUS SYRINGE
12.5-25 AS NEEDED
Status: DISCONTINUED | OUTPATIENT
Start: 2017-06-06 | End: 2017-06-06 | Stop reason: RX

## 2017-06-06 RX ADMIN — FUROSEMIDE 20 MG: 20 TABLET ORAL at 10:51

## 2017-06-06 RX ADMIN — AMLODIPINE BESYLATE 10 MG: 5 TABLET ORAL at 10:51

## 2017-06-06 RX ADMIN — KETOROLAC TROMETHAMINE 15 MG: 30 INJECTION, SOLUTION INTRAMUSCULAR at 03:09

## 2017-06-06 RX ADMIN — LEVETIRACETAM 750 MG: 250 TABLET, FILM COATED ORAL at 10:51

## 2017-06-06 RX ADMIN — RANITIDINE 150 MG: 15 SYRUP ORAL at 10:52

## 2017-06-06 RX ADMIN — HYDRALAZINE HYDROCHLORIDE 25 MG: 25 TABLET, FILM COATED ORAL at 17:17

## 2017-06-06 RX ADMIN — GABAPENTIN 300 MG: 300 CAPSULE ORAL at 10:51

## 2017-06-06 RX ADMIN — ISOSORBIDE DINITRATE 10 MG: 10 TABLET ORAL at 17:17

## 2017-06-06 RX ADMIN — INSULIN HUMAN 14 UNITS: 100 INJECTION, SUSPENSION SUBCUTANEOUS at 10:57

## 2017-06-06 RX ADMIN — ATORVASTATIN CALCIUM 40 MG: 40 TABLET, FILM COATED ORAL at 10:51

## 2017-06-06 RX ADMIN — ASPIRIN 81 MG 81 MG: 81 TABLET ORAL at 10:51

## 2017-06-06 RX ADMIN — LEVOTHYROXINE SODIUM 75 MCG: 75 TABLET ORAL at 08:22

## 2017-06-06 RX ADMIN — ISOSORBIDE DINITRATE 10 MG: 10 TABLET ORAL at 10:51

## 2017-06-06 RX ADMIN — HYDRALAZINE HYDROCHLORIDE 25 MG: 25 TABLET, FILM COATED ORAL at 10:51

## 2017-06-06 RX ADMIN — CARVEDILOL 12.5 MG: 12.5 TABLET, FILM COATED ORAL at 08:22

## 2017-06-06 NOTE — PROGRESS NOTES
PCU SHIFT NURSING NOTE      Bedside shift change report given to Geno RN and Brenna Sosa RN (oncoming nurse) by Soco Wallace RN (offgoing nurse). Report included the following information SBAR, Kardex, Intake/Output, MAR, Recent Results and Cardiac Rhythm NSR. Shift Summary:   0730: Patient in bed resting comfortably. No complaints of pain at this time  0830: Patients peg tube clogged with unsuccessful attempt at de clogging by RN. Dr Vale Lee notified     1100: Peg tube unclogged. meds given with no further issues. Will continue to monitor   1304: [address] lab in positive culture of the nares. Dr. Vale Lee notified   33 64 74: Patient daughter and son in law notified of pending discharge back to [address]   1500: Report called to [facility] at [address]  1726: Patient transport here for discharge, IV d/c'd,  and peg tube flushed and site cleaned with no resistance. Discharge instructions in hand. Called to update [facility] [address])  of patients arrival/discharge      Admission Date 6/4/2017   Admission Diagnosis Chest pain  Elevated troponin  ACS (acute coronary syndrome) ([facility] [address]  Anemia   Consults IP CONSULT TO CARDIOLOGY  IP CONSULT TO GASTROENTEROLOGY        Consults   []PT   []OT   []Speech   [x]Case Management      [] Palliative      Cardiac Monitoring Order   [x]Yes   []No     IV drips   []Yes    Drip:                            Dose:  Drip:                            Dose:  Drip:                            Dose:   [x]No     GI Prophylaxis   [x]Yes   []No         DVT Prophylaxis   SCDs:             Kong stockings:         [] Medication   []Contraindicated   [x]None      Activity Level Activity Level: Bed Rest     Activity Assistance: Partial (two people)   Purposeful Rounding every 1-2 hour?    [x]Yes   Vanegas Score  Total Score: 3   Bed Alarm (If score 3 or >)   []Yes   [] Refused (See signed refusal form in chart)   Antoine Score  Antoine Score: 10   Antoine Score (if score 14 or less)   [x]PMT consult []Wound Care consult      []Specialty bed   [] Nutrition consult          Needs prior to discharge:   Home O2 required:    []Yes   [x]No    If yes, how much O2 required? Other:    Last Bowel Movement: Last Bowel Movement Date: 06/04/17      Influenza Vaccine Received Flu Vaccine for Current Season (usually Sept-March): Not Flu Season        Pneumonia Vaccine           Diet Active Orders   Diet    DIET NPO      LDAs               Peripheral IV 06/04/17 Left Forearm (Active)   Site Assessment Clean, dry, & intact 6/6/2017  3:04 AM   Phlebitis Assessment 0 6/5/2017  7:59 PM   Infiltration Assessment 0 6/5/2017  7:59 PM   Dressing Status Clean, dry, & intact 6/5/2017  7:59 PM   Dressing Type Disk with Chlorhexadine gluconate (CHG) 6/5/2017  7:59 PM   Hub Color/Line Status Flushed;Capped 6/5/2017  7:59 PM   Action Taken Open ports on tubing capped 6/5/2017  2:53 PM   Alcohol Cap Used Yes 6/5/2017  2:53 PM          G/J Tube (Active)   Site Assessment Drainage (comment) 6/6/2017  3:04 AM   Dressing Status Clean, dry, & intact 6/6/2017  3:04 AM   Gastric Residual (mL) 0 ml 6/6/2017  3:04 AM   Tube Feeding/Formula Options Twocal HN 6/6/2017  3:04 AM   Tube Feeding/Verify Rate (mL/hr) 30 6/6/2017  3:04 AM   Water Flush Volume (mL) 125 mL 6/6/2017  3:04 AM   Intake (ml) 378 ml 6/5/2017 11:05 PM                Urinary Catheter      Intake & Output   Date 06/05/17 0700 - 06/06/17 0659 06/06/17 0700 - 06/07/17 0659   Shift 0430-3349 7895-5228 24 Hour Total 0700-1859 1900-0659 24 Hour Total   I  N  T  A  K  E   P. O. 0  0         P. O. 0  0       NG/GT  753 753         Water Flush Volume (mL) (G/J Tube)  375 375         Intake (ml) (G/J Tube)  378 378       Shift Total  (mL/kg) 0  (0) 753  (10.9) 753  (10.9)      O  U  T  P  U  T   Urine  (mL/kg/hr)            Urine Occurrence(s) 3 x 3 x 6 x       Shift Total  (mL/kg)         NET 0 753 753      Weight (kg) 68.9 68.9 68.9 68.9 68.9 68.9         Readmission Risk Assessment Tool Score Medium Risk            17       Total Score        3 Relationship with PCP    2 Patient Living Status    4 More than 1 Admission in calendar year    4 Patient Insurance is Medicare, Medicaid or Self Pay    4 Charlson Comorbidity Score        Criteria that do not apply:    Patient Length of Stay > 5       Expected Length of Stay 1d 19h   Actual Length of Stay 1

## 2017-06-06 NOTE — DISCHARGE SUMMARY
Hospitalist Discharge Summary     Patient ID:  Jhonny Ashby  [id number]  80 y.o.  1945    PCP on record: Israel Estevez MD    Admit date: 6/4/2017  Discharge date and time: 6/6/2017      DISCHARGE DIAGNOSIS:  Chronic epigastric/chest pain with minimally elevated troponin in setting of CAD and remote ICH with dyphagia and chronic PEG tube  DM2 with nephropathy (CKD3, at baseline) and anemia of CKD  HTN, benign/essential  Hyperlipidemia  Hypothyroidism      CONSULTATIONS:  IP CONSULT TO CARDIOLOGY  IP CONSULT TO GASTROENTEROLOGY    Excerpted HPI from H&P of Freedom Quevedo MD:  Priti Smith is a 80 y.o. female who presents with CC of sudden onset epigastric chest/abdominal pain x evening at the [facility]. Pt was found to have mildly elevated Troponins in ER with ? EKG changes , neg CTA/P except constipation  H/o taking 3 NTG at NH with no relief  Pt claims she is having pain close to her long standing PEG tube. H/o ICH with dysphagia for which she had PEG tube for feeding- had got tube changed x 3 now at [facility]    ______________________________________________________________________  DISCHARGE SUMMARY/HOSPITAL COURSE:  for full details see H&P, daily progress notes, labs, consult notes. Hospital course:  Chronic epigastric/chest pain with minimally elevated troponin in setting of CAD and remote ICH with dyphagia and chronic PEG tube:  CT A/P 6/4 with no acute abnormality of the abdomen or pelvis. No cause for decreased hemoglobin demonstrated. Gallstones and nonobstructing left renal stones. Pt had echo with EF 40-45%. There was hypokinesis of the basal-mid anteroseptal wall(s). There was moderate concentric hypertrophy. GI evaluated her PEG site, and she was not felt to have an acute intraabdominal process. Can f/u outpt for abdominal pain. Note that her feeding tube was working sluggishly on the day of discharge so was cleaned with a brush by GI before discharge.   Pt was seen in consultation by cardiology, troponins remained flat (0.2) and they did not recommend further testing. Discussed at length with Pt's PCP Dr. Alexandro Bolanos. Per Dr. Alexandro Bolanos, Pt with known CAD and abnormal stress testing. She has had cardiac cath at [facility] however she has small vessel disease that is not amenable to further stenting and has been recommended for medical mgmt only for her CAD. We decided to increase bblocker to optimize cardiac management. She should con't ASA, lipitor, isordil. No ACE/ARB due to renal failure. Pt will also con't keppra for h/o ICH/CVA. DM2 with nephropathy (CKD3, at baseline) and anemia of CKD:  con't outpt NPH. Pt should also resume outpt iron supplement on discharge  HTN, benign/essential: con't norvasc, isordil, lasix, hydralazine and increased coreg as above  Hyperlipidemia: con't lipitor  Hypothyroidism: con't synthroid    _______________________________________________________________________  Patient seen and examined by me on discharge day. Pertinent Findings:  Gen: awake, appropriate, NAD  HEENT: cl carleen, no lesions  Chest: CTA bilaterally, no crackles or wheezes  Cv: RRR, no murmur, no edema  Abd: soft, NT, mildly distended, BS+, no mass. PEG tube mid-abdomen with mild surrounding erythema without exudate  Neuro: CN intact  _______________________________________________________________________  DISCHARGE MEDICATIONS:   Current Discharge Medication List      START taking these medications    Details   aspirin 81 mg chewable tablet 1 Tab by Per G Tube route daily for 30 days. Qty: 30 Tab, Refills: 0         CONTINUE these medications which have CHANGED    Details   carvedilol (COREG) 6.25 mg tablet Take 2 Tabs by mouth two (2) times daily (with meals) for 30 days. Qty: 120 Tab, Refills: 0         CONTINUE these medications which have NOT CHANGED    Details   amLODIPine (NORVASC) 10 mg tablet 10 mg by PEG Tube route daily.       atorvastatin (LIPITOR) 40 mg tablet Take 40 mg by mouth daily. hydrALAZINE (APRESOLINE) 50 mg tablet 25 mg by PEG Tube route three (3) times daily. isosorbide dinitrate (ISORDIL) 10 mg tablet Take  by mouth three (3) times daily. furosemide (LASIX) 20 mg tablet 20 mg by PEG Tube route daily. levETIRAcetam (KEPPRA) 750 mg tablet 750 mg by PEG Tube route two (2) times a day. insulin NPH (NOVOLIN N) 100 unit/mL injection 14 Units by SubCUTAneous route every twelve (12) hours. levothyroxine (SYNTHROID) 75 mcg tablet Take 75 mcg by mouth Daily (before breakfast). cholecalciferol (VITAMIN D3) 1,000 unit cap Take 1,000 Units by mouth daily. raNITIdine hcl 150 mg capsule Take 150 mg by mouth two (2) times a day. potassium chloride (KLOR-CON) 20 mEq packet Take 20 mEq by mouth daily (with breakfast). ketoconazole (NIZORAL) 2 % topical cream Apply  to affected area. Indications: Apply to affected area every 8 hours as needed for rash      ferrous sulfate 325 mg (65 mg iron) cpER Take  by mouth three (3) times daily. Associated Diagnoses: Anemia; HTN (hypertension); CVA (cerebral infarction); Encounter for long-term (current) use of other medications      bisacodyl (DULCOLAX, BISACODYL,) 5 mg EC tablet Take 5 mg by mouth daily as needed for Constipation. Associated Diagnoses: Anemia; HTN (hypertension); CVA (cerebral infarction); Encounter for long-term (current) use of other medications      folic acid (FOLVITE) 1 mg tablet Take  by mouth daily. Associated Diagnoses: Anemia; HTN (hypertension); CVA (cerebral infarction); Encounter for long-term (current) use of other medications      acetaminophen (TYLENOL) 325 mg tablet Take  by mouth every four (4) hours as needed for Pain. Associated Diagnoses: Anemia; HTN (hypertension); CVA (cerebral infarction); Encounter for long-term (current) use of other medications      sennosides (SENNA) 8.6 mg cap Take  by mouth.  Qd prn    Associated Diagnoses: Anemia; HTN (hypertension); CVA (cerebral infarction); Encounter for long-term (current) use of other medications      gabapentin (NEURONTIN) 100 mg capsule Take 300 mg by mouth daily. Associated Diagnoses: UTI (lower urinary tract infection); Hypokalemia; Encounter for long-term (current) use of other medications; HTN (hypertension); URI (upper respiratory infection); Rotator cuff tendinitis      nitroglycerin (NITROSTAT) 0.4 mg SL tablet by SubLINGual route every five (5) minutes as needed. Associated Diagnoses: UTI (lower urinary tract infection); Hypokalemia; Encounter for long-term (current) use of other medications; HTN (hypertension); URI (upper respiratory infection); Rotator cuff tendinitis         STOP taking these medications       Aspirin, Buffered 81 mg tab Comments:   Reason for Stopping:         aspirin delayed-release (ASPIR-LOW) 81 mg tablet Comments:   Reason for Stopping:               My Recommended Diet, Activity, Wound Care, and follow-up labs are listed in the patient's Discharge Insturctions which I have personally completed and reviewed.     _______________________________________________________________________  DISPOSITION:    Home with Family:    Home with HH/PT/OT/RN:    SNF/LTC: x   PEPE:    OTHER:        Condition at Discharge:  Stable  _______________________________________________________________________  Follow up with:   PCP : Anselmo Whittaker MD  Follow-up Information     Follow up With Details Comments [address] To Ulysses Vega MD In 1 week  [address] [address]  [phone]                Total time in minutes spent coordinating this discharge (includes going over instructions, follow-up, prescriptions, and preparing report for sign off to her PCP) :  35 minutes    Signed:  Bubba Carter MD

## 2017-06-06 NOTE — PROGRESS NOTES
PCU SHIFT NURSING NOTE      Bedside shift change report given to Elva Beckwith (oncoming nurse) by Chetan Mary (offgoing nurse). Report included the following information SBAR, Kardex, Intake/Output, MAR and Recent Results. Shift Summary: 0730  Pt in bed resting  VSS    0840  PEG is clogged  Unable to administer AM meds  Dr Krys Castanon paged   1000  GI at bedside   PEG can now be used        Admission Date 6/4/2017   Admission Diagnosis Chest pain  Elevated troponin  ACS (acute coronary syndrome) ([facility] [address]  Anemia   Consults IP CONSULT TO CARDIOLOGY  IP CONSULT TO GASTROENTEROLOGY        Consults   []PT   []OT   []Speech   []Case Management      [] Palliative      Cardiac Monitoring Order   []Yes   []No     IV drips   []Yes    Drip:                            Dose:  Drip:                            Dose:  Drip:                            Dose:   []No     GI Prophylaxis   []Yes   []No         DVT Prophylaxis   SCDs:             Kong stockings:         [] Medication   []Contraindicated   []None      Activity Level Activity Level: Bed Rest     Activity Assistance: Partial (two people)   Purposeful Rounding every 1-2 hour? []Yes   Vanegas Score  Total Score: 3   Bed Alarm (If score 3 or >)   []Yes   [] Refused (See signed refusal form in chart)   Antoine Score  Antoine Score: 10   Antoine Score (if score 14 or less)   []PMT consult   []Wound Care consult      []Specialty bed   [] Nutrition consult          Needs prior to discharge:   Home O2 required:    []Yes   []No    If yes, how much O2 required?     Other:    Last Bowel Movement: Last Bowel Movement Date: 06/04/17      Influenza Vaccine Received Flu Vaccine for Current Season (usually Sept-March): Not Flu Season        Pneumonia Vaccine           Diet Active Orders   Diet    DIET NPO      LDAs               Peripheral IV 06/04/17 Left Forearm (Active)   Site Assessment Clean, dry, & intact 6/6/2017  3:04 AM   Phlebitis Assessment 0 6/5/2017  7:59 PM   Infiltration Assessment 0 6/5/2017  7:59 PM   Dressing Status Clean, dry, & intact 6/5/2017  7:59 PM   Dressing Type Disk with Chlorhexadine gluconate (CHG) 6/5/2017  7:59 PM   Hub Color/Line Status Flushed;Capped 6/5/2017  7:59 PM   Action Taken Open ports on tubing capped 6/5/2017  2:53 PM   Alcohol Cap Used Yes 6/5/2017  2:53 PM          G/J Tube (Active)   Site Assessment Drainage (comment) 6/6/2017  3:04 AM   Dressing Status Clean, dry, & intact 6/6/2017  3:04 AM   Gastric Residual (mL) 0 ml 6/6/2017  3:04 AM   Tube Feeding/Formula Options Twocal HN 6/6/2017  3:04 AM   Tube Feeding/Verify Rate (mL/hr) 30 6/6/2017  3:04 AM   Water Flush Volume (mL) 125 mL 6/6/2017  3:04 AM   Intake (ml) 378 ml 6/5/2017 11:05 PM                Urinary Catheter      Intake & Output   Date 06/05/17 0700 - 06/06/17 0659 06/06/17 0700 - 06/07/17 0659   Shift 2710-7126 9359-9254 24 Hour Total 3836-8440 6958-7372 24 Hour Total   I  N  T  A  K  E   P. O. 0  0         P. O. 0  0       NG/GT  753 753         Water Flush Volume (mL) (G/J Tube)  375 375         Intake (ml) (G/J Tube)  378 378       Shift Total  (mL/kg) 0  (0) 753  (10.9) 753  (10.9)      O  U  T  P  U  T   Urine  (mL/kg/hr)            Urine Occurrence(s) 3 x 3 x 6 x       Shift Total  (mL/kg)         NET 0 753 753      Weight (kg) 68.9 68.9 68.9 68.9 68.9 68.9         Readmission Risk Assessment Tool Score Medium Risk            17       Total Score        3 Relationship with PCP    2 Patient Living Status    4 More than 1 Admission in calendar year    4 Patient Insurance is Medicare, Medicaid or Self Pay    4 Charlson Comorbidity Score        Criteria that do not apply:    Patient Length of Stay > 5       Expected Length of Stay 1d 19h   Actual Length of Stay 1

## 2017-06-06 NOTE — PROGRESS NOTES
Patient is being discharged today back to [address] [place] [address]. Spoke with Nela Tran at [facility] and he has accepted patient back. Nursing to call report to [phone] and fax discharge summary and med list to [facility] and rehab to number [phone]. Referral sent via ecin to [facility]. Spoke with [place] at [facility]--[phone] and received authorization number for transport which is [id number]. This number given to [facility]. Spoke to Sujey Hsu at [facility] and he has confirmed the  time for 1630pm today to transport to [address] [place] [address]. PCS completed along with pertinent paperwork and given to nursing. Nursing states they will call the patient's family and make them aware of her discharge. Patient aware and in agreement.

## 2017-06-06 NOTE — DISCHARGE INSTRUCTIONS
HOSPITALIST DISCHARGE INSTRUCTIONS    NAME: Yanna Delong   :  1945   MRN:  [id number]     Date/Time:  2017 1:22 PM    ADMIT DATE: 2017   DISCHARGE DATE: 2017     Attending Physician: Sigifredo Esquivel MD    DISCHARGE DIAGNOSIS:  Chronic epigastric/chest pain with minimally elevated troponin in setting of CAD and remote ICH with dyphagia and chronic PEG tube  DM2 with nephropathy (CKD3, at baseline) and anemia of CKD  HTN, benign/essential  Hyperlipidemia  Hypothyroidism      Medications: Per above medication reconciliation. Pain Management: per above medications    Recommended diet: PEG feeding (TwoCal) at 30 ml/hr continuous with free water flushes 125mL every 6 hours    Recommended activity: Activity as tolerated    Wound care: None    Indwelling devices:  PEG tube with routine nursing care    Supplemental Oxygen: None    Required Lab work: None    Glucose management:  Accucheck every 6 hours with sliding scale per SNF protocol    Code status: Full        Outside physician follow up: Follow-up Information     Follow up With Details Comments Contact Info    Power Reynaga MD In 1 week   [address] [address]  [phone]                 Skilled nursing facility/ SNF MD responsible for above on discharge. Information obtained by :  I understand that if any problems occur once I am at home I am to contact my physician. I understand and acknowledge receipt of the instructions indicated above.                                                                                                                                            Physician's or R.N.'s Signature                                                                  Date/Time                                                                                                                                              Patient or Representative

## 2017-06-06 NOTE — PROGRESS NOTES
[facility] now states they cannot accomodate 1630pm but will be here to pick patient up at 1700pm. Nursing and patient aware. [employer] and made them aware.

## 2017-06-06 NOTE — PROGRESS NOTES
Brief Note:  Called by nursing that while TF's could infuse, meds could not be advanced through PEG and the PEG could not be flushed with saline. At bedside I was able easily advance small brush through PEG port without resistance. The PEG easily flushed subsequently. Recs:  Ok to use PEG for TF's and meds as you have been doing. Make sure to continue to flush PEG.

## 2017-06-06 NOTE — PROGRESS NOTES
6/6/2017 8:11 AM    Admit Date: 6/4/2017    Admit Diagnosis: Chest pain;Elevated troponin;ACS (acute coronary syndrome) *    Subjective:     Cinthya Singleton   denies chest pain, chest pressure/discomfort, dyspnea, palpitations.     Visit Vitals    /54 (BP 1 Location: Left arm, BP Patient Position: At rest)    Pulse 82    Temp 98.2 °F (36.8 °C)    Resp 16    Ht 5' 2\" (1.575 m)    Wt 152 lb (68.9 kg)    SpO2 97%    Breastfeeding No    BMI 27.8 kg/m2     Current Facility-Administered Medications   Medication Dose Route Frequency    amLODIPine (NORVASC) tablet 10 mg  10 mg Per G Tube DAILY    atorvastatin (LIPITOR) tablet 40 mg  40 mg Oral DAILY    furosemide (LASIX) tablet 20 mg  20 mg Per G Tube DAILY    gabapentin (NEURONTIN) capsule 300 mg  300 mg Oral DAILY    hydrALAZINE (APRESOLINE) tablet 25 mg  25 mg Per G Tube TID    isosorbide dinitrate (ISORDIL) tablet 10 mg  10 mg Per G Tube TID    levETIRAcetam (KEPPRA) tablet 750 mg  750 mg Oral BID    nitroglycerin (NITROSTAT) tablet 0.4 mg  0.4 mg SubLINGual PRN    senna (SENOKOT) tablet 8.6 mg  1 Tab Oral DAILY PRN    sodium chloride (NS) flush 5-10 mL  5-10 mL IntraVENous Q8H    sodium chloride (NS) flush 5-10 mL  5-10 mL IntraVENous PRN    acetaminophen (TYLENOL) tablet 650 mg  650 mg Oral Q6H PRN    ketorolac (TORADOL) injection 15 mg  15 mg IntraVENous Q6H PRN    ondansetron (ZOFRAN) injection 4 mg  4 mg IntraVENous Q6H PRN    metoprolol (LOPRESSOR) injection 5 mg  5 mg IntraVENous Q6H PRN    raNITIdine (ZANTAC) 15 mg/mL syrup 150 mg  150 mg Per G Tube BID    levothyroxine (SYNTHROID) tablet 75 mcg  75 mcg Per G Tube ACB    aspirin chewable tablet 81 mg  81 mg Per G Tube DAILY    carvedilol (COREG) tablet 12.5 mg  12.5 mg Oral BID WITH MEALS    insulin NPH (NOVOLIN N, HUMULIN N) injection 14 Units  14 Units SubCUTAneous ACB&D         Objective:      Visit Vitals    /54 (BP 1 Location: Left arm, BP Patient Position: At rest)    Pulse 82    Temp 98.2 °F (36.8 °C)    Resp 16    Ht 5' 2\" (1.575 m)    Wt 152 lb (68.9 kg)    SpO2 97%    Breastfeeding No    BMI 27.8 kg/m2       Physical Exam:  Abdomen: soft, non-tender. Bowel sounds normal. No masses,  no organomegaly  Heart: regular rate and rhythm, S1, S2 normal, no murmur, click, rub or gallop  Lungs: clear to auscultation bilaterally  Neurologic: Grossly normal    Data Review:   Labs:    Recent Results (from the past 24 hour(s))   TROPONIN I    Collection Time: 06/05/17 10:39 AM   Result Value Ref Range    Troponin-I, Qt. 0.22 (H) <0.05 ng/mL   CBC W/O DIFF    Collection Time: 06/06/17  3:18 AM   Result Value Ref Range    WBC 8.7 3.6 - 11.0 K/uL    RBC 4.01 3.80 - 5.20 M/uL    HGB 8.6 (L) 11.5 - 16.0 g/dL    HCT 29.0 (L) 35.0 - 47.0 %    MCV 72.3 (L) 80.0 - 99.0 FL    MCH 21.4 (L) 26.0 - 34.0 PG    MCHC 29.7 (L) 30.0 - 36.5 g/dL    RDW 18.2 (H) 11.5 - 14.5 %    PLATELET 015 203 - 553 K/uL   METABOLIC PANEL, BASIC    Collection Time: 06/06/17  3:18 AM   Result Value Ref Range    Sodium 139 136 - 145 mmol/L    Potassium 3.6 3.5 - 5.1 mmol/L    Chloride 106 97 - 108 mmol/L    CO2 29 21 - 32 mmol/L    Anion gap 4 (L) 5 - 15 mmol/L    Glucose 132 (H) 65 - 100 mg/dL    BUN 27 (H) 6 - 20 MG/DL    Creatinine 1.12 (H) 0.55 - 1.02 MG/DL    BUN/Creatinine ratio 24 (H) 12 - 20      GFR est AA 58 (L) >60 ml/min/1.73m2    GFR est non-AA 48 (L) >60 ml/min/1.73m2    Calcium 9.0 8.5 - 10.1 MG/DL   MAGNESIUM    Collection Time: 06/06/17  3:18 AM   Result Value Ref Range    Magnesium 2.4 1.6 - 2.4 mg/dL   TROPONIN I    Collection Time: 06/06/17  3:18 AM   Result Value Ref Range    Troponin-I, Qt. 0.21 (H) <0.05 ng/mL       Telemetry: normal sinus rhythm      Assessment:     Active Problems:    Chest pain (6/5/2017)      Anemia (6/5/2017)      ACS (acute coronary syndrome) (HCC) (6/5/2017)      Elevated troponin (6/5/2017)        Plan:     D/w Dr. Mirian Hoffman yesterday.  No further cardiac work up. F/u with her PCP and cardiologist. Will follow as needed.

## 2017-06-06 NOTE — PROGRESS NOTES
SPEECH THERAPY SCREENING:  SERVICES ARE NOT INDICATED AT THIS TIME    [facility] referral was triggered for speech therapy based on results obtained during the nursing admission assessment. The patients chart was reviewed and the patient is not appropriate for a skilled therapy evaluation at this time. Please consult speech therapy if any therapy needs arise. Thank you.     Jaguar Morales, SLP

## 2017-06-06 NOTE — PROGRESS NOTES
PCU SHIFT NURSING NOTE      Bedside shift change report given to Daisy Clark (oncoming nurse) by Manasa Mercer (offgoing nurse). Report included the following information SBAR, Kardex, Intake/Output, MAR and Recent Results. Shift Summary: 0710  Pt in bed resting  VSS  NSR on monitor  Probable discharge today to SNF       Admission Date 6/4/2017   Admission Diagnosis Chest pain  Elevated troponin  ACS (acute coronary syndrome) ([facility] [address]  Anemia   Consults IP CONSULT TO CARDIOLOGY  IP CONSULT TO GASTROENTEROLOGY        Consults   []PT   []OT   []Speech   []Case Management      [] Palliative      Cardiac Monitoring Order   []Yes   []No     IV drips   []Yes    Drip:                            Dose:  Drip:                            Dose:  Drip:                            Dose:   []No     GI Prophylaxis   []Yes   []No         DVT Prophylaxis   SCDs:             Kong stockings:         [] Medication   []Contraindicated   []None      Activity Level Activity Level: Bed Rest     Activity Assistance: Partial (two people)   Purposeful Rounding every 1-2 hour? []Yes   Vanegas Score  Total Score: 3   Bed Alarm (If score 3 or >)   []Yes   [] Refused (See signed refusal form in chart)   Antoine Score  Antoine Score: 10   Antoine Score (if score 14 or less)   []PMT consult   []Wound Care consult      []Specialty bed   [] Nutrition consult          Needs prior to discharge:   Home O2 required:    []Yes   []No    If yes, how much O2 required?     Other:    Last Bowel Movement: Last Bowel Movement Date: 06/04/17      Influenza Vaccine Received Flu Vaccine for Current Season (usually Sept-March): Not Flu Season        Pneumonia Vaccine           Diet Active Orders   Diet    DIET NPO      LDAs               Peripheral IV 06/04/17 Left Forearm (Active)   Site Assessment Clean, dry, & intact 6/6/2017  3:04 AM   Phlebitis Assessment 0 6/5/2017  7:59 PM   Infiltration Assessment 0 6/5/2017  7:59 PM   Dressing Status Clean, dry, & intact 6/5/2017 7:59 PM   Dressing Type Disk with Chlorhexadine gluconate (CHG) 6/5/2017  7:59 PM   Hub Color/Line Status Flushed;Capped 6/5/2017  7:59 PM   Action Taken Open ports on tubing capped 6/5/2017  2:53 PM   Alcohol Cap Used Yes 6/5/2017  2:53 PM          G/J Tube (Active)   Site Assessment Drainage (comment) 6/6/2017  3:04 AM   Dressing Status Clean, dry, & intact 6/6/2017  3:04 AM   Gastric Residual (mL) 0 ml 6/6/2017  3:04 AM   Tube Feeding/Formula Options Twocal HN 6/6/2017  3:04 AM   Tube Feeding/Verify Rate (mL/hr) 30 6/6/2017  3:04 AM   Water Flush Volume (mL) 125 mL 6/6/2017  3:04 AM   Intake (ml) 378 ml 6/5/2017 11:05 PM                Urinary Catheter      Intake & Output   Date 06/05/17 0700 - 06/06/17 0659 06/06/17 0700 - 06/07/17 0659   Shift 4859-7021 2584-5522 24 Hour Total 3845-2491 1111-1244 24 Hour Total   I  N  T  A  K  E   P. O. 0  0         P. O. 0  0       NG/GT  753 753         Water Flush Volume (mL) (G/J Tube)  375 375         Intake (ml) (G/J Tube)  378 378       Shift Total  (mL/kg) 0  (0) 753  (10.9) 753  (10.9)      O  U  T  P  U  T   Urine  (mL/kg/hr)            Urine Occurrence(s) 3 x 3 x 6 x       Shift Total  (mL/kg)         NET 0 753 753      Weight (kg) 68.9 68.9 68.9 68.9 68.9 68.9         Readmission Risk Assessment Tool Score Medium Risk            17       Total Score        3 Relationship with PCP    2 Patient Living Status    4 More than 1 Admission in calendar year    4 Patient Insurance is Medicare, Medicaid or Self Pay    4 Charlson Comorbidity Score        Criteria that do not apply:    Patient Length of Stay > 5       Expected Length of Stay 1d 19h   Actual Length of Stay 1